# Patient Record
Sex: MALE | Race: WHITE | NOT HISPANIC OR LATINO | Employment: OTHER | URBAN - METROPOLITAN AREA
[De-identification: names, ages, dates, MRNs, and addresses within clinical notes are randomized per-mention and may not be internally consistent; named-entity substitution may affect disease eponyms.]

---

## 2024-01-09 ENCOUNTER — OFFICE VISIT (OUTPATIENT)
Dept: ORTHOPEDIC SURGERY | Facility: CLINIC | Age: 49
End: 2024-01-09
Payer: MEDICARE

## 2024-01-09 ENCOUNTER — HOSPITAL ENCOUNTER (OUTPATIENT)
Dept: RADIOLOGY | Facility: CLINIC | Age: 49
Discharge: HOME | End: 2024-01-09
Payer: MEDICARE

## 2024-01-09 VITALS — HEIGHT: 72 IN

## 2024-01-09 DIAGNOSIS — R52 PAIN: ICD-10-CM

## 2024-01-09 DIAGNOSIS — M79.644 FINGER PAIN, RIGHT: Primary | ICD-10-CM

## 2024-01-09 DIAGNOSIS — S62.609G: ICD-10-CM

## 2024-01-09 PROCEDURE — 73130 X-RAY EXAM OF HAND: CPT | Mod: RT

## 2024-01-09 PROCEDURE — 1036F TOBACCO NON-USER: CPT | Performed by: ORTHOPAEDIC SURGERY

## 2024-01-09 PROCEDURE — 99213 OFFICE O/P EST LOW 20 MIN: CPT | Performed by: ORTHOPAEDIC SURGERY

## 2024-01-09 PROCEDURE — 99203 OFFICE O/P NEW LOW 30 MIN: CPT | Performed by: ORTHOPAEDIC SURGERY

## 2024-01-09 RX ORDER — SAW PALMETTO 160 MG
160 CAPSULE ORAL 2 TIMES DAILY
COMMUNITY

## 2024-01-09 RX ORDER — AZELASTINE 1 MG/ML
1 SPRAY, METERED NASAL 2 TIMES DAILY
COMMUNITY

## 2024-01-09 RX ORDER — EPINEPHRINE 0.3 MG/.3ML
1 INJECTION SUBCUTANEOUS ONCE AS NEEDED
COMMUNITY
Start: 2024-01-06

## 2024-01-09 RX ORDER — CETIRIZINE HYDROCHLORIDE 10 MG/1
10 TABLET ORAL DAILY PRN
COMMUNITY

## 2024-01-09 RX ORDER — MONTELUKAST SODIUM 10 MG/1
10 TABLET ORAL NIGHTLY
COMMUNITY

## 2024-01-09 RX ORDER — ALBUTEROL SULFATE 90 UG/1
2 AEROSOL, METERED RESPIRATORY (INHALATION) EVERY 4 HOURS PRN
COMMUNITY
Start: 2024-01-06

## 2024-01-09 RX ORDER — FLUTICASONE FUROATE AND VILANTEROL 200; 25 UG/1; UG/1
1 POWDER RESPIRATORY (INHALATION) 2 TIMES WEEKLY
COMMUNITY

## 2024-01-09 RX ORDER — FLUTICASONE PROPIONATE 50 MCG
2 SPRAY, SUSPENSION (ML) NASAL DAILY
COMMUNITY
Start: 2024-01-06

## 2024-01-09 ASSESSMENT — PATIENT HEALTH QUESTIONNAIRE - PHQ9
1. LITTLE INTEREST OR PLEASURE IN DOING THINGS: NOT AT ALL
2. FEELING DOWN, DEPRESSED OR HOPELESS: NOT AT ALL
SUM OF ALL RESPONSES TO PHQ9 QUESTIONS 1 AND 2: 0

## 2024-01-09 ASSESSMENT — PAIN SCALES - GENERAL: PAINLEVEL: 0-NO PAIN

## 2024-01-09 NOTE — PROGRESS NOTES
Subjective      Chief Complaint   Patient presents with    Right Hand - Fracture     Rt pinky finger fx 10/2023- not seen        No surgery found     HPI    This 48-year-old gentleman injured his right small finger in October Of 2023.  He never sought any treatment.  He comes in today complaining of some right small finger pain but mostly complaining of inability to move his right small finger as well as a significant deformity.    CARDIOLOGY:   Negative for chest pain, shortness of breath.   RESPIRATORY:   Negative for chest pain, shortness of breath.   MUSCULOSKELETAL:   See HPI for details.   NEUROLOGY:   Negative for tingling, numbness, weakness.    Objective    There were no vitals filed for this visit.    Physical Exam  GENERAL:          General Appearance:  This is a pleasant patient with appropriate affect, in no acute distress.   DERMATOLOGY:          Skin: skin at the neck, upper and lower back, and trunk is intact. There is no evidence of skin rash, skin breakdown or ulceration, or atrophic skin change.   EXTREMITIES:          Vascular:  Right, left hands and feet are warm with good color and pulses. Right and left calf and thigh are nontender and nonswollen.   NEUROLOGICAL:          Orientation:  Patient is alert and oriented to person, place, time and situation. Right and left upper and lower extremity motor and sensory examinations are intact.      MUSCULOSKELETAL: Neck: Nontender. No pain with range of motion. Right hand:   There is an obvious deformity at the PIP joint of the right small finger.  The patient is unable to move his right small finger and has pain using his right hand for activities such as using a cane and holding it in his right hand and also shaking hands with his right hand.    XR hand right 3+ views  Done and read in the office today show a dislocation at the PIP joint of the right small finger.  I reviewed these x-rays with the patient in the office today.    Result Date:  1/9/2024  These images are not reportable by radiology and will not be interpreted by  Radiologists.       Kashif was seen today for fracture.  Diagnoses and all orders for this visit:  Finger pain, right (Primary)  Closed fracture dislocation of proximal interphalangeal (PIP) joint of finger with delayed healing     Options are discussed with the patient in detail. The patient is given a   Written referral to be further evaluated and treated by either Dr. Eric Cameron or Dr. Dick Loya and is given their office numbers and directions on how to get to their office.The patient is instructed regarding activity modification and risk for further injury with falling or trauma, ice, physician directed at home gentle strengthening and ROM exercises, and the appropriate use of Tylenol as needed for pain with its potential adverse reactions and side effects. The patient understands  And appreciates this.  Return to see me as needed.. Please note that this report has been produced using speech recognition software.  It may contain errors related to grammar, punctuation or spelling.  Electronically signed, but not reviewed.    Abdon Mayorga MD

## 2024-01-17 ENCOUNTER — PRE-ADMISSION TESTING (OUTPATIENT)
Dept: PREADMISSION TESTING | Facility: HOSPITAL | Age: 49
End: 2024-01-17
Payer: MEDICARE

## 2024-01-17 ENCOUNTER — LAB (OUTPATIENT)
Dept: LAB | Facility: LAB | Age: 49
End: 2024-01-17
Payer: MEDICARE

## 2024-01-17 VITALS
BODY MASS INDEX: 35.56 KG/M2 | TEMPERATURE: 97 F | WEIGHT: 262.57 LBS | OXYGEN SATURATION: 98 % | SYSTOLIC BLOOD PRESSURE: 158 MMHG | DIASTOLIC BLOOD PRESSURE: 92 MMHG | HEIGHT: 72 IN | HEART RATE: 84 BPM

## 2024-01-17 DIAGNOSIS — Z01.818 PREOP TESTING: Primary | ICD-10-CM

## 2024-01-17 DIAGNOSIS — Z01.818 PREOP TESTING: ICD-10-CM

## 2024-01-17 LAB
ANION GAP SERPL CALC-SCNC: 11 MMOL/L
BUN SERPL-MCNC: 15 MG/DL (ref 8–25)
CALCIUM SERPL-MCNC: 9.1 MG/DL (ref 8.5–10.4)
CHLORIDE SERPL-SCNC: 100 MMOL/L (ref 97–107)
CO2 SERPL-SCNC: 26 MMOL/L (ref 24–31)
CREAT SERPL-MCNC: 0.8 MG/DL (ref 0.4–1.6)
EGFRCR SERPLBLD CKD-EPI 2021: >90 ML/MIN/1.73M*2
GLUCOSE SERPL-MCNC: 211 MG/DL (ref 65–99)
POTASSIUM SERPL-SCNC: 4.5 MMOL/L (ref 3.4–5.1)
SODIUM SERPL-SCNC: 137 MMOL/L (ref 133–145)

## 2024-01-17 PROCEDURE — 99203 OFFICE O/P NEW LOW 30 MIN: CPT | Performed by: NURSE PRACTITIONER

## 2024-01-17 PROCEDURE — 36415 COLL VENOUS BLD VENIPUNCTURE: CPT

## 2024-01-17 PROCEDURE — 80048 BASIC METABOLIC PNL TOTAL CA: CPT

## 2024-01-17 PROCEDURE — 93005 ELECTROCARDIOGRAM TRACING: CPT

## 2024-01-17 RX ORDER — TURMERIC 400 MG
1 CAPSULE ORAL DAILY
COMMUNITY

## 2024-01-17 RX ORDER — GINKGO BILOBA LEAF EXTRACT 60 MG
1 CAPSULE ORAL DAILY
COMMUNITY

## 2024-01-17 RX ORDER — BISMUTH SUBSALICYLATE 262 MG
1 TABLET,CHEWABLE ORAL DAILY
COMMUNITY

## 2024-01-17 RX ORDER — IBUPROFEN 100 MG/5ML
1000 SUSPENSION, ORAL (FINAL DOSE FORM) ORAL DAILY
COMMUNITY

## 2024-01-17 RX ORDER — UBIDECARENONE 30 MG
30 CAPSULE ORAL DAILY
COMMUNITY

## 2024-01-17 RX ORDER — GLUCOSAM/CHONDRO/HERB 149/HYAL 750-100 MG
1 TABLET ORAL DAILY
COMMUNITY

## 2024-01-17 ASSESSMENT — CHADS2 SCORE
DIABETES: NO
HYPERTENSION: NO
CHADS2 SCORE: 0
AGE GREATER THAN OR EQUAL TO 75: NO
PRIOR STROKE OR TIA OR THROMBOEMBOLISM: NO
CHF: NO
AGE GREATER THAN OR EQUAL TO 75: NO

## 2024-01-17 ASSESSMENT — ENCOUNTER SYMPTOMS
CONSTITUTIONAL NEGATIVE: 1
PSYCHIATRIC NEGATIVE: 1
NEUROLOGICAL NEGATIVE: 1
CARDIOVASCULAR NEGATIVE: 1
HEMATOLOGIC/LYMPHATIC NEGATIVE: 1
GASTROINTESTINAL NEGATIVE: 1

## 2024-01-17 ASSESSMENT — DUKE ACTIVITY SCORE INDEX (DASI)
CAN YOU WALK A BLOCK OR TWO ON LEVEL GROUND: YES
CAN YOU DO HEAVY WORK AROUND THE HOUSE LIKE SCRUBBING FLOORS OR LIFTING AND MOVING HEAVY FURNITURE: YES
CAN YOU PARTICIPATE IN STRENOUS SPORTS LIKE SWIMMING, SINGLES TENNIS, FOOTBALL, BASKETBALL, OR SKIING: YES
CAN YOU DO LIGHT WORK AROUND THE HOUSE LIKE DUSTING OR WASHING DISHES: YES
CAN YOU HAVE SEXUAL RELATIONS: YES
TOTAL_SCORE: 58.2
CAN YOU DO MODERATE WORK AROUND THE HOUSE LIKE VACUUMING, SWEEPING FLOORS OR CARRYING GROCERIES: YES
DASI METS SCORE: 9.9
CAN YOU WALK INDOORS, SUCH AS AROUND YOUR HOUSE: YES
CAN YOU RUN A SHORT DISTANCE: YES
CAN YOU DO YARD WORK LIKE RAKING LEAVES, WEEDING OR PUSHING A MOWER: YES
CAN YOU PARTICIPATE IN MODERATE RECREATIONAL ACTIVITIES LIKE GOLF, BOWLING, DANCING, DOUBLES TENNIS OR THROWING A BASEBALL OR FOOTBALL: YES
CAN YOU TAKE CARE OF YOURSELF (EAT, DRESS, BATHE, OR USE TOILET): YES
CAN YOU CLIMB A FLIGHT OF STAIRS OR WALK UP A HILL: YES

## 2024-01-17 NOTE — H&P (VIEW-ONLY)
CPM/PAT Evaluation       Name: Kashif Perdomo (Kashif Perdomo)  /Age: 1975/48 y.o.     In-Person       Chief Complaint: right small finger    HPI    Pt is a 48 year old male who reports injuring his right small finger in October. Pt stated at the time he did not receive any medical attention for the injury. Pt stated he wanted to see if his finger would get better on its own. Over the last few months he noticed his right small finger remained slightly swollen and was painful to bend. He also reports that he is unable to bend his right small finger to  objects. Pt was examined by a surgeon and completed an xray. He has been scheduled for open reduction internal fixation digit hand right and repair soft tissue hand. Pt denies CP, SOB, or dizziness    Past Medical History:   Diagnosis Date    Asthma     Depression     Environmental allergies     ESTELA (obstructive sleep apnea)        Past Surgical History:   Procedure Laterality Date    APPENDECTOMY      NASAL SEPTUM SURGERY      SINUS SURGERY       Social History     Tobacco Use    Smoking status: Never    Smokeless tobacco: Former   Substance Use Topics    Alcohol use: Not Currently     Comment: LESS THAN MONTHLY     Social History     Substance and Sexual Activity   Drug Use Never       Allergies   Allergen Reactions    Dog Dander Other and Unknown     Asthma flares up, sometimes will break out in hives    House Dust Mite Unknown     Nose runs    Mold Unknown     Nose runs    Pollen, Micronized Unknown     Nose runs    Prednisone Anxiety     Current Outpatient Medications   Medication Sig Dispense Refill    albuterol 90 mcg/actuation inhaler Inhale 2 puffs every 4 hours if needed for other or wheezing.      ascorbic acid (Vitamin C) 1,000 mg tablet Take 1 tablet (1,000 mg) by mouth once daily.      azelastine (Astelin) 137 mcg (0.1 %) nasal spray Administer 1 spray into each nostril 2 times a day. Use in each nostril as directed      B complex-vitamin  C-folic acid (Nephro-Dwight Rx) 1- mg-mg-mcg tablet Take 1 tablet by mouth once daily with breakfast.      calcium-vits C8-Q-K7-minerals 166.75 mg- 166.75 unit capsule Take 1 capsule by mouth once daily.      cetirizine (ZyrTEC) 10 mg tablet Take 1 tablet (10 mg) by mouth once daily as needed for allergies.      co-enzyme Q-10 30 mg capsule Take 1 capsule (30 mg) by mouth once daily.      EPINEPHrine 0.3 mg/0.3 mL injection syringe Inject 0.3 mL (0.3 mg) into the muscle 1 time if needed for anaphylaxis.      fluticasone (Flonase) 50 mcg/actuation nasal spray Administer 2 sprays into each nostril once daily.      fluticasone furoate-vilanteroL (Breo Ellipta) 200-25 mcg/dose inhaler Inhale 1 puff 2 times a week.      GARLIC EXTRACT ORAL Take by mouth.      GINKGO BILOBA EXTRACT ORAL Take 1 capsule by mouth once daily.      ginseng 100 mg capsule Take 1 capsule by mouth once daily.      montelukast (Singulair) 10 mg tablet Take 1 tablet (10 mg) by mouth once daily at bedtime.      multivitamin tablet Take 1 tablet by mouth once daily.      omega 3-dha-epa-fish oil (Fish OiL) 1,000 mg (120 mg-180 mg) capsule Take 1 capsule (1,000 mg) by mouth once daily.      potassium iodide 65 mg tablet Take 1 capsule by mouth once daily.      saw palmetto 160 mg capsule Take 1 capsule (160 mg) by mouth 2 times a day.      turmeric 400 mg capsule Take 1 Capful by mouth once daily.       No current facility-administered medications for this visit.     Review of Systems   Constitutional: Negative.    HENT: Negative.     Respiratory:          H/O Asthma and severe environmental allergies   Cardiovascular: Negative.    Gastrointestinal: Negative.    Genitourinary: Negative.    Musculoskeletal:         Occasional hip and knee pain; pt stated he uses a cane to ambulate long distances.   Skin: Negative.    Neurological: Negative.    Hematological: Negative.    Psychiatric/Behavioral: Negative.       BP (!) 170/91   Pulse 84   Temp 36.1  °C (97 °F) (Temporal)   Ht 1.829 m (6')   Wt 119 kg (262 lb 9.1 oz)   SpO2 98%   BMI 35.61 kg/m²   Rechecked BP using manual BP cuff BP: 158/92    Physical Exam  Vitals reviewed.   Constitutional:       Appearance: Normal appearance.   HENT:      Head: Normocephalic and atraumatic.      Nose: Nose normal.      Mouth/Throat:      Mouth: Mucous membranes are moist.      Pharynx: Oropharynx is clear.   Eyes:      Extraocular Movements: Extraocular movements intact.      Pupils: Pupils are equal, round, and reactive to light.   Cardiovascular:      Rate and Rhythm: Normal rate and regular rhythm.      Pulses: Normal pulses.      Heart sounds: Normal heart sounds.   Pulmonary:      Effort: Pulmonary effort is normal.      Breath sounds: Normal breath sounds. No wheezing, rhonchi or rales.   Abdominal:      General: Bowel sounds are normal.      Palpations: Abdomen is soft.   Musculoskeletal:         General: Swelling (mild swelling noted to proximal right small finger.) and tenderness (pain with ROM of right small finger.) present.      Cervical back: Normal range of motion.   Skin:     General: Skin is warm and dry.   Neurological:      General: No focal deficit present.      Mental Status: He is alert and oriented to person, place, and time.   Psychiatric:         Mood and Affect: Mood normal.         Behavior: Behavior normal.         Thought Content: Thought content normal.         Judgment: Judgment normal.        PAT AIRWAY:   Airway:     Mallampati::  III    TM distance::  >3 FB    Neck ROM::  Full  normal      ASA:II  DASI: 58.2  METS: 9.9  CHADS: 1.9%  RCRI: 0.4%  Ariscat: 1.6%    Assessment and Plan:     Dislocation of proximal interphalangeal joint of right little finger: open reduction internal fixation digit hand right; repair soft tissue hand.  Asthma: Pt is taking Breo Ellipta twice per week. Pt reports he has not needed to use his albuterol.   ESTELA: noncompliant with CPAP  BMI: 35.61  HTN: Pt stated he  has never been diagnosed with HTN;  I advised pt to start checking his BP. I advised him to establish a PCP to have his Blood pressure monitored. BP today in PAT is 158/92. Pt stated he is going to try reducing his caffeine intake. Pt stated he drinks 6 cups of coffee per day. EKG performed in [facility].    JULIA Carlos-CNP

## 2024-01-17 NOTE — PREPROCEDURE INSTRUCTIONS
Medication List            Accurate as of January 17, 2024  7:25 AM. Always use your most recent med list.                albuterol 90 mcg/actuation inhaler  Notes to patient: USE MORNING OF SURGERY/BRING TO HOSPITAL     ascorbic acid 1,000 mg tablet  Commonly known as: Vitamin C  Medication Adjustments for Surgery: Stop 7 days before surgery     azelastine 137 mcg (0.1 %) nasal spray  Commonly known as: Astelin  Medication Adjustments for Surgery: Continue until night before surgery     B complex-vitamin C-folic acid 1- mg-mg-mcg tablet  Commonly known as: Nephro-Dwight Rx  Medication Adjustments for Surgery: Stop 7 days before surgery     Breo Ellipta 200-25 mcg/dose inhaler  Generic drug: fluticasone furoate-vilanteroL  Notes to patient: USE MORNING OF SURGERY     calcium-vits Z8-U-I3-minerals 166.75 mg- 166.75 unit capsule  Medication Adjustments for Surgery: Stop 7 days before surgery     cetirizine 10 mg tablet  Commonly known as: ZyrTEC  Medication Adjustments for Surgery: Continue until night before surgery     co-enzyme Q-10 30 mg capsule  Medication Adjustments for Surgery: Stop 7 days before surgery     EPINEPHrine 0.3 mg/0.3 mL injection syringe  Commonly known as: Epipen  Notes to patient: BRING TO HOSPITAL     Fish OiL 1,000 mg (120 mg-180 mg) capsule  Generic drug: omega 3-dha-epa-fish oil     fluticasone 50 mcg/actuation nasal spray  Commonly known as: Flonase  Medication Adjustments for Surgery: Continue until night before surgery     GARLIC EXTRACT ORAL  Medication Adjustments for Surgery: Stop 7 days before surgery     GINKGO BILOBA EXTRACT ORAL  Medication Adjustments for Surgery: Stop 7 days before surgery     ginseng 100 mg capsule  Medication Adjustments for Surgery: Stop 7 days before surgery     montelukast 10 mg tablet  Commonly known as: Singulair  Medication Adjustments for Surgery: Take morning of surgery with sip of water, no other fluids     multivitamin tablet  Medication  Adjustments for Surgery: Stop 7 days before surgery     potassium iodide 65 mg tablet  Medication Adjustments for Surgery: Stop 7 days before surgery     saw palmetto 160 mg capsule  Medication Adjustments for Surgery: Stop 7 days before surgery     turmeric 400 mg capsule  Medication Adjustments for Surgery: Stop 7 days before surgery                              NPO Instructions:    Do not eat any food after midnight the night before your surgery/procedure.    Additional Instructions:     Day of Surgery:  Review your medication instructions, take indicated medications  Wear  comfortable loose fitting clothing  Do not use moisturizers, creams, lotions or perfume  All jewelry and valuables should be left at home  PAT DISCHARGE INSTRUCTIONS    Please call the Same Day Surgery (SDS) Department of the hospital where your procedure will be performed after 2:00 PM the day before your surgery. If you are scheduled on a Monday, or a Tuesday following a Monday holiday, you will need to call on the last business day prior to your surgery.    [address], [id number] [phone]      Please let your surgeon know if:      You develop any open sores, shingles, burning or painful urination as these may increase your risk of an infection.   You no longer wish to have the surgery.   Any other personal circumstances change that may lead to the need to cancel or defer this surgery-such as being sick or getting admitted to any hospital within one week of your planned procedure.    Your contact details change, such as a change of address or phone number.    Starting now:     Please DO NOT drink alcohol or smoke for 24 hours before surgery. It is well known that quitting smoking can make a huge difference to your health and recovery from surgery. The longer you abstain from smoking, the better your chances of a healthy recovery. If you need help with quitting, call  1-800-QUIT-NOW to be connected to a trained counselor who will discuss the best methods to help you quit.     Before your surgery:    Please stop all supplements 7 days prior to surgery. Or as directed by your surgeon.   Please stop taking NSAID pain medicine such as Advil and Motrin 7 days before surgery.    If you develop any fever, cough, cold, rashes, cuts, scratches, scrapes, urinary symptoms or infection anywhere on your body (including teeth and gums) prior to surgery, please call your surgeon’s office as soon as possible. This may require treatment to reduce the chance of cancellation on the day of surgery.    The day before your surgery:   DIET- Do not eat any food after MIDNIGHT. May have 10 ounces of CLEAR LIQUIDS until TWO HOURS before your arrival time. This includes water, black tea or coffee (no milk ir cream), apple juice and electrolyte drinks (Gatorade). May chew gum until TWO hours before your surgery time.   Get a good night’s rest.  Use the special soap for bathing if you have been instructed to use one.    Scheduled surgery times may change and you will be notified if this occurs - please check your personal voicemail for any updates.     On the morning of surgery:   Wear comfortable, loose fitting clothes which open in the front. Please do not wear moisturizers, creams, lotions, makeup or perfume.    Please bring with you to surgery:   Photo ID and insurance card   Current list of medicines and allergies   Pacemaker/ Defibrillator/Heart stent cards   CPAP machine and mask    Slings/ splints/ crutches   A copy of your complete advanced directive/DHPOA.    Please do NOT bring with you to surgery:   All jewelry and valuables should be left at home.   Prosthetic devices such as contact lenses, hearing aids, dentures, eyelash extensions, hairpins and body piercings must be removed prior to going in to the surgical suite.    After outpatient surgery:   A responsible adult MUST accompany you at the  time of discharge and stay with you for 24 hours after your surgery. You may NOT drive yourself home after surgery.    Do not drive, operate machinery, make critical decisions or do activities that require co-ordination or balance until after a night’s sleep.   Do not drink alcoholic beverages for 24 hours.   Instructions for resuming your medications will be provided by your surgeon.    CALL YOUR DOCTOR AFTER SURGERY IF YOU HAVE:     Chills and/or a fever of 101° F or higher.    Redness, swelling, pus or drainage from your surgical wound or a bad smell from the wound.    Lightheadedness, fainting or confusion.    Persistent vomiting (throwing up) and are not able to eat or drink for 12 hours.    Three or more loose, watery bowel movements in 24 hours (diarrhea).   Difficulty or pain while urinating( after non-urological surgery)    Pain and swelling in your legs, especially if it is only on one side.    Difficulty breathing or are breathing faster than normal.    Any new concerning symptoms.

## 2024-01-17 NOTE — CPM/PAT H&P
CPM/PAT Evaluation       Name: Kashif Perdomo (Kashif Perdomo)  /Age: 1975/48 y.o.     In-Person       Chief Complaint: right small finger    HPI    Pt is a 48 year old male who reports injuring his right small finger in October. Pt stated at the time he did not receive any medical attention for the injury. Pt stated he wanted to see if his finger would get better on its own. Over the last few months he noticed his right small finger remained slightly swollen and was painful to bend. He also reports that he is unable to bend his right small finger to  objects. Pt was examined by a surgeon and completed an xray. He has been scheduled for open reduction internal fixation digit hand right and repair soft tissue hand. Pt denies CP, SOB, or dizziness    Past Medical History:   Diagnosis Date    Asthma     Depression     Environmental allergies     ESTELA (obstructive sleep apnea)        Past Surgical History:   Procedure Laterality Date    APPENDECTOMY      NASAL SEPTUM SURGERY      SINUS SURGERY       Social History     Tobacco Use    Smoking status: Never    Smokeless tobacco: Former   Substance Use Topics    Alcohol use: Not Currently     Comment: LESS THAN MONTHLY     Social History     Substance and Sexual Activity   Drug Use Never       Allergies   Allergen Reactions    Dog Dander Other and Unknown     Asthma flares up, sometimes will break out in hives    House Dust Mite Unknown     Nose runs    Mold Unknown     Nose runs    Pollen, Micronized Unknown     Nose runs    Prednisone Anxiety     Current Outpatient Medications   Medication Sig Dispense Refill    albuterol 90 mcg/actuation inhaler Inhale 2 puffs every 4 hours if needed for other or wheezing.      ascorbic acid (Vitamin C) 1,000 mg tablet Take 1 tablet (1,000 mg) by mouth once daily.      azelastine (Astelin) 137 mcg (0.1 %) nasal spray Administer 1 spray into each nostril 2 times a day. Use in each nostril as directed      B complex-vitamin  C-folic acid (Nephro-Dwight Rx) 1- mg-mg-mcg tablet Take 1 tablet by mouth once daily with breakfast.      calcium-vits W9-Q-S5-minerals 166.75 mg- 166.75 unit capsule Take 1 capsule by mouth once daily.      cetirizine (ZyrTEC) 10 mg tablet Take 1 tablet (10 mg) by mouth once daily as needed for allergies.      co-enzyme Q-10 30 mg capsule Take 1 capsule (30 mg) by mouth once daily.      EPINEPHrine 0.3 mg/0.3 mL injection syringe Inject 0.3 mL (0.3 mg) into the muscle 1 time if needed for anaphylaxis.      fluticasone (Flonase) 50 mcg/actuation nasal spray Administer 2 sprays into each nostril once daily.      fluticasone furoate-vilanteroL (Breo Ellipta) 200-25 mcg/dose inhaler Inhale 1 puff 2 times a week.      GARLIC EXTRACT ORAL Take by mouth.      GINKGO BILOBA EXTRACT ORAL Take 1 capsule by mouth once daily.      ginseng 100 mg capsule Take 1 capsule by mouth once daily.      montelukast (Singulair) 10 mg tablet Take 1 tablet (10 mg) by mouth once daily at bedtime.      multivitamin tablet Take 1 tablet by mouth once daily.      omega 3-dha-epa-fish oil (Fish OiL) 1,000 mg (120 mg-180 mg) capsule Take 1 capsule (1,000 mg) by mouth once daily.      potassium iodide 65 mg tablet Take 1 capsule by mouth once daily.      saw palmetto 160 mg capsule Take 1 capsule (160 mg) by mouth 2 times a day.      turmeric 400 mg capsule Take 1 Capful by mouth once daily.       No current facility-administered medications for this visit.     Review of Systems   Constitutional: Negative.    HENT: Negative.     Respiratory:          H/O Asthma and severe environmental allergies   Cardiovascular: Negative.    Gastrointestinal: Negative.    Genitourinary: Negative.    Musculoskeletal:         Occasional hip and knee pain; pt stated he uses a cane to ambulate long distances.   Skin: Negative.    Neurological: Negative.    Hematological: Negative.    Psychiatric/Behavioral: Negative.       BP (!) 170/91   Pulse 84   Temp 36.1  °C (97 °F) (Temporal)   Ht 1.829 m (6')   Wt 119 kg (262 lb 9.1 oz)   SpO2 98%   BMI 35.61 kg/m²   Rechecked BP using manual BP cuff BP: 158/92    Physical Exam  Vitals reviewed.   Constitutional:       Appearance: Normal appearance.   HENT:      Head: Normocephalic and atraumatic.      Nose: Nose normal.      Mouth/Throat:      Mouth: Mucous membranes are moist.      Pharynx: Oropharynx is clear.   Eyes:      Extraocular Movements: Extraocular movements intact.      Pupils: Pupils are equal, round, and reactive to light.   Cardiovascular:      Rate and Rhythm: Normal rate and regular rhythm.      Pulses: Normal pulses.      Heart sounds: Normal heart sounds.   Pulmonary:      Effort: Pulmonary effort is normal.      Breath sounds: Normal breath sounds. No wheezing, rhonchi or rales.   Abdominal:      General: Bowel sounds are normal.      Palpations: Abdomen is soft.   Musculoskeletal:         General: Swelling (mild swelling noted to proximal right small finger.) and tenderness (pain with ROM of right small finger.) present.      Cervical back: Normal range of motion.   Skin:     General: Skin is warm and dry.   Neurological:      General: No focal deficit present.      Mental Status: He is alert and oriented to person, place, and time.   Psychiatric:         Mood and Affect: Mood normal.         Behavior: Behavior normal.         Thought Content: Thought content normal.         Judgment: Judgment normal.        PAT AIRWAY:   Airway:     Mallampati::  III    TM distance::  >3 FB    Neck ROM::  Full  normal      ASA:II  DASI: 58.2  METS: 9.9  CHADS: 1.9%  RCRI: 0.4%  Ariscat: 1.6%    Assessment and Plan:     Dislocation of proximal interphalangeal joint of right little finger: open reduction internal fixation digit hand right; repair soft tissue hand.  Asthma: Pt is taking Breo Ellipta twice per week. Pt reports he has not needed to use his albuterol.   ESTELA: noncompliant with CPAP  BMI: 35.61  HTN: Pt stated he  has never been diagnosed with HTN;  I advised pt to start checking his BP. I advised him to establish a PCP to have his Blood pressure monitored. BP today in PAT is 158/92. Pt stated he is going to try reducing his caffeine intake. Pt stated he drinks 6 cups of coffee per day. EKG performed in [facility].    JULIA Carlos-CNP

## 2024-01-26 ENCOUNTER — APPOINTMENT (OUTPATIENT)
Dept: PREADMISSION TESTING | Facility: HOSPITAL | Age: 49
End: 2024-01-26
Payer: MEDICARE

## 2024-01-31 ENCOUNTER — HOSPITAL ENCOUNTER (OUTPATIENT)
Facility: HOSPITAL | Age: 49
Setting detail: OUTPATIENT SURGERY
Discharge: HOME | End: 2024-01-31
Attending: ORTHOPAEDIC SURGERY | Admitting: ORTHOPAEDIC SURGERY
Payer: MEDICARE

## 2024-01-31 ENCOUNTER — PHARMACY VISIT (OUTPATIENT)
Dept: PHARMACY | Facility: CLINIC | Age: 49
End: 2024-01-31
Payer: MEDICARE

## 2024-01-31 ENCOUNTER — APPOINTMENT (OUTPATIENT)
Dept: RADIOLOGY | Facility: HOSPITAL | Age: 49
End: 2024-01-31
Payer: MEDICARE

## 2024-01-31 ENCOUNTER — ANESTHESIA EVENT (OUTPATIENT)
Dept: OPERATING ROOM | Facility: HOSPITAL | Age: 49
End: 2024-01-31
Payer: MEDICARE

## 2024-01-31 ENCOUNTER — ANESTHESIA (OUTPATIENT)
Dept: OPERATING ROOM | Facility: HOSPITAL | Age: 49
End: 2024-01-31
Payer: MEDICARE

## 2024-01-31 VITALS
TEMPERATURE: 97.5 F | HEIGHT: 72 IN | WEIGHT: 262 LBS | BODY MASS INDEX: 35.49 KG/M2 | HEART RATE: 62 BPM | RESPIRATION RATE: 16 BRPM | OXYGEN SATURATION: 99 % | SYSTOLIC BLOOD PRESSURE: 152 MMHG | DIASTOLIC BLOOD PRESSURE: 89 MMHG

## 2024-01-31 DIAGNOSIS — S62.609G: Primary | ICD-10-CM

## 2024-01-31 PROBLEM — I10 HTN (HYPERTENSION): Status: ACTIVE | Noted: 2024-01-31

## 2024-01-31 PROBLEM — G47.33 OSA (OBSTRUCTIVE SLEEP APNEA): Status: ACTIVE | Noted: 2024-01-31

## 2024-01-31 PROBLEM — J45.909 ASTHMA (HHS-HCC): Status: ACTIVE | Noted: 2024-01-31

## 2024-01-31 PROCEDURE — 7100000009 HC PHASE TWO TIME - INITIAL BASE CHARGE: Performed by: ORTHOPAEDIC SURGERY

## 2024-01-31 PROCEDURE — 2500000005 HC RX 250 GENERAL PHARMACY W/O HCPCS: Performed by: ORTHOPAEDIC SURGERY

## 2024-01-31 PROCEDURE — A26785 PR OPEN TX INTERPHALANGEAL JOINT DISLOCATION 1: Performed by: STUDENT IN AN ORGANIZED HEALTH CARE EDUCATION/TRAINING PROGRAM

## 2024-01-31 PROCEDURE — 2500000004 HC RX 250 GENERAL PHARMACY W/ HCPCS (ALT 636 FOR OP/ED): Performed by: ORTHOPAEDIC SURGERY

## 2024-01-31 PROCEDURE — A26785 PR OPEN TX INTERPHALANGEAL JOINT DISLOCATION 1: Performed by: ANESTHESIOLOGIST ASSISTANT

## 2024-01-31 PROCEDURE — 3600000003 HC OR TIME - INITIAL BASE CHARGE - PROCEDURE LEVEL THREE: Performed by: ORTHOPAEDIC SURGERY

## 2024-01-31 PROCEDURE — 2500000005 HC RX 250 GENERAL PHARMACY W/O HCPCS: Performed by: STUDENT IN AN ORGANIZED HEALTH CARE EDUCATION/TRAINING PROGRAM

## 2024-01-31 PROCEDURE — 2500000004 HC RX 250 GENERAL PHARMACY W/ HCPCS (ALT 636 FOR OP/ED): Performed by: ANESTHESIOLOGIST ASSISTANT

## 2024-01-31 PROCEDURE — 2720000007 HC OR 272 NO HCPCS: Performed by: ORTHOPAEDIC SURGERY

## 2024-01-31 PROCEDURE — 3700000001 HC GENERAL ANESTHESIA TIME - INITIAL BASE CHARGE: Performed by: ORTHOPAEDIC SURGERY

## 2024-01-31 PROCEDURE — 2500000004 HC RX 250 GENERAL PHARMACY W/ HCPCS (ALT 636 FOR OP/ED): Performed by: STUDENT IN AN ORGANIZED HEALTH CARE EDUCATION/TRAINING PROGRAM

## 2024-01-31 PROCEDURE — 76000 FLUOROSCOPY <1 HR PHYS/QHP: CPT

## 2024-01-31 PROCEDURE — 3600000008 HC OR TIME - EACH INCREMENTAL 1 MINUTE - PROCEDURE LEVEL THREE: Performed by: ORTHOPAEDIC SURGERY

## 2024-01-31 PROCEDURE — 3700000002 HC GENERAL ANESTHESIA TIME - EACH INCREMENTAL 1 MINUTE: Performed by: ORTHOPAEDIC SURGERY

## 2024-01-31 PROCEDURE — RXMED WILLOW AMBULATORY MEDICATION CHARGE

## 2024-01-31 PROCEDURE — 7100000010 HC PHASE TWO TIME - EACH INCREMENTAL 1 MINUTE: Performed by: ORTHOPAEDIC SURGERY

## 2024-01-31 PROCEDURE — 7100000001 HC RECOVERY ROOM TIME - INITIAL BASE CHARGE: Performed by: ORTHOPAEDIC SURGERY

## 2024-01-31 PROCEDURE — 7100000002 HC RECOVERY ROOM TIME - EACH INCREMENTAL 1 MINUTE: Performed by: ORTHOPAEDIC SURGERY

## 2024-01-31 RX ORDER — IBUPROFEN 400 MG/1
600 TABLET ORAL EVERY 6 HOURS PRN
Status: DISCONTINUED | OUTPATIENT
Start: 2024-01-31 | End: 2024-01-31 | Stop reason: HOSPADM

## 2024-01-31 RX ORDER — SODIUM CHLORIDE, SODIUM LACTATE, POTASSIUM CHLORIDE, CALCIUM CHLORIDE 600; 310; 30; 20 MG/100ML; MG/100ML; MG/100ML; MG/100ML
100 INJECTION, SOLUTION INTRAVENOUS CONTINUOUS
Status: DISCONTINUED | OUTPATIENT
Start: 2024-01-31 | End: 2024-01-31 | Stop reason: HOSPADM

## 2024-01-31 RX ORDER — MIDAZOLAM HYDROCHLORIDE 1 MG/ML
INJECTION, SOLUTION INTRAMUSCULAR; INTRAVENOUS AS NEEDED
Status: DISCONTINUED | OUTPATIENT
Start: 2024-01-31 | End: 2024-01-31

## 2024-01-31 RX ORDER — FENTANYL CITRATE 50 UG/ML
50 INJECTION, SOLUTION INTRAMUSCULAR; INTRAVENOUS EVERY 5 MIN PRN
Status: DISCONTINUED | OUTPATIENT
Start: 2024-01-31 | End: 2024-01-31 | Stop reason: HOSPADM

## 2024-01-31 RX ORDER — ONDANSETRON HYDROCHLORIDE 2 MG/ML
4 INJECTION, SOLUTION INTRAVENOUS ONCE AS NEEDED
Status: DISCONTINUED | OUTPATIENT
Start: 2024-01-31 | End: 2024-01-31 | Stop reason: HOSPADM

## 2024-01-31 RX ORDER — PROPOFOL 10 MG/ML
INJECTION, EMULSION INTRAVENOUS AS NEEDED
Status: DISCONTINUED | OUTPATIENT
Start: 2024-01-31 | End: 2024-01-31

## 2024-01-31 RX ORDER — IPRATROPIUM BROMIDE 0.5 MG/2.5ML
500 SOLUTION RESPIRATORY (INHALATION) EVERY 30 MIN PRN
Status: DISCONTINUED | OUTPATIENT
Start: 2024-01-31 | End: 2024-01-31 | Stop reason: HOSPADM

## 2024-01-31 RX ORDER — SODIUM CHLORIDE, SODIUM LACTATE, POTASSIUM CHLORIDE, CALCIUM CHLORIDE 600; 310; 30; 20 MG/100ML; MG/100ML; MG/100ML; MG/100ML
40 INJECTION, SOLUTION INTRAVENOUS CONTINUOUS
Status: DISCONTINUED | OUTPATIENT
Start: 2024-01-31 | End: 2024-01-31 | Stop reason: HOSPADM

## 2024-01-31 RX ORDER — ALBUTEROL SULFATE 0.83 MG/ML
2.5 SOLUTION RESPIRATORY (INHALATION) EVERY 30 MIN PRN
Status: DISCONTINUED | OUTPATIENT
Start: 2024-01-31 | End: 2024-01-31 | Stop reason: HOSPADM

## 2024-01-31 RX ORDER — OXYCODONE AND ACETAMINOPHEN 5; 325 MG/1; MG/1
1 TABLET ORAL EVERY 6 HOURS PRN
Status: DISCONTINUED | OUTPATIENT
Start: 2024-01-31 | End: 2024-01-31 | Stop reason: HOSPADM

## 2024-01-31 RX ORDER — LIDOCAINE HYDROCHLORIDE 10 MG/ML
INJECTION INFILTRATION; PERINEURAL AS NEEDED
Status: DISCONTINUED | OUTPATIENT
Start: 2024-01-31 | End: 2024-01-31

## 2024-01-31 RX ORDER — CEFAZOLIN SODIUM 2 G/100ML
2 INJECTION, SOLUTION INTRAVENOUS ONCE
Status: COMPLETED | OUTPATIENT
Start: 2024-01-31 | End: 2024-01-31

## 2024-01-31 RX ORDER — BUPIVACAINE HYDROCHLORIDE 2.5 MG/ML
INJECTION, SOLUTION INFILTRATION; PERINEURAL AS NEEDED
Status: DISCONTINUED | OUTPATIENT
Start: 2024-01-31 | End: 2024-01-31 | Stop reason: HOSPADM

## 2024-01-31 RX ORDER — PROPOFOL 10 MG/ML
INJECTION, EMULSION INTRAVENOUS CONTINUOUS PRN
Status: DISCONTINUED | OUTPATIENT
Start: 2024-01-31 | End: 2024-01-31

## 2024-01-31 RX ORDER — IBUPROFEN 600 MG/1
600 TABLET ORAL 4 TIMES DAILY PRN
Qty: 30 TABLET | Refills: 0 | Status: SHIPPED | OUTPATIENT
Start: 2024-01-31

## 2024-01-31 RX ORDER — FENTANYL CITRATE 50 UG/ML
INJECTION, SOLUTION INTRAMUSCULAR; INTRAVENOUS AS NEEDED
Status: DISCONTINUED | OUTPATIENT
Start: 2024-01-31 | End: 2024-01-31

## 2024-01-31 RX ORDER — OXYCODONE AND ACETAMINOPHEN 5; 325 MG/1; MG/1
1 TABLET ORAL EVERY 6 HOURS PRN
Qty: 20 TABLET | Refills: 0 | Status: SHIPPED | OUTPATIENT
Start: 2024-01-31

## 2024-01-31 RX ORDER — KETAMINE HYDROCHLORIDE 50 MG/ML
INJECTION, SOLUTION INTRAMUSCULAR; INTRAVENOUS AS NEEDED
Status: DISCONTINUED | OUTPATIENT
Start: 2024-01-31 | End: 2024-01-31

## 2024-01-31 RX ORDER — LIDOCAINE HYDROCHLORIDE 10 MG/ML
INJECTION INFILTRATION; PERINEURAL AS NEEDED
Status: DISCONTINUED | OUTPATIENT
Start: 2024-01-31 | End: 2024-01-31 | Stop reason: HOSPADM

## 2024-01-31 RX ORDER — LABETALOL HYDROCHLORIDE 5 MG/ML
5 INJECTION, SOLUTION INTRAVENOUS EVERY 5 MIN PRN
Status: DISCONTINUED | OUTPATIENT
Start: 2024-01-31 | End: 2024-01-31 | Stop reason: HOSPADM

## 2024-01-31 RX ORDER — ONDANSETRON HYDROCHLORIDE 2 MG/ML
INJECTION, SOLUTION INTRAVENOUS AS NEEDED
Status: DISCONTINUED | OUTPATIENT
Start: 2024-01-31 | End: 2024-01-31

## 2024-01-31 RX ORDER — KETOROLAC TROMETHAMINE 30 MG/ML
INJECTION, SOLUTION INTRAMUSCULAR; INTRAVENOUS AS NEEDED
Status: DISCONTINUED | OUTPATIENT
Start: 2024-01-31 | End: 2024-01-31

## 2024-01-31 RX ADMIN — FENTANYL CITRATE 25 MCG: 50 INJECTION, SOLUTION INTRAMUSCULAR; INTRAVENOUS at 13:54

## 2024-01-31 RX ADMIN — KETOROLAC TROMETHAMINE 30 MG: 30 INJECTION, SOLUTION INTRAMUSCULAR at 14:49

## 2024-01-31 RX ADMIN — LABETALOL HYDROCHLORIDE 5 MG: 5 INJECTION, SOLUTION INTRAVENOUS at 15:10

## 2024-01-31 RX ADMIN — FENTANYL CITRATE 50 MCG: 50 INJECTION, SOLUTION INTRAMUSCULAR; INTRAVENOUS at 13:47

## 2024-01-31 RX ADMIN — PROPOFOL 30 MG: 10 INJECTION, EMULSION INTRAVENOUS at 14:41

## 2024-01-31 RX ADMIN — PROPOFOL 150 MG: 10 INJECTION, EMULSION INTRAVENOUS at 13:47

## 2024-01-31 RX ADMIN — FENTANYL CITRATE 25 MCG: 50 INJECTION, SOLUTION INTRAMUSCULAR; INTRAVENOUS at 14:08

## 2024-01-31 RX ADMIN — PROPOFOL 100 MCG/KG/MIN: 10 INJECTION, EMULSION INTRAVENOUS at 13:47

## 2024-01-31 RX ADMIN — ONDANSETRON HYDROCHLORIDE 4 MG: 2 INJECTION INTRAMUSCULAR; INTRAVENOUS at 14:49

## 2024-01-31 RX ADMIN — PROPOFOL 10 MG: 10 INJECTION, EMULSION INTRAVENOUS at 14:16

## 2024-01-31 RX ADMIN — PROPOFOL 60 MG: 10 INJECTION, EMULSION INTRAVENOUS at 14:35

## 2024-01-31 RX ADMIN — SODIUM CHLORIDE, SODIUM LACTATE, POTASSIUM CHLORIDE, AND CALCIUM CHLORIDE 100 ML/HR: 600; 310; 30; 20 INJECTION, SOLUTION INTRAVENOUS at 12:51

## 2024-01-31 RX ADMIN — CEFAZOLIN SODIUM 2 G: 2 INJECTION, SOLUTION INTRAVENOUS at 13:44

## 2024-01-31 RX ADMIN — KETAMINE HYDROCHLORIDE 25 MG: 50 INJECTION, SOLUTION INTRAMUSCULAR; INTRAVENOUS at 13:53

## 2024-01-31 RX ADMIN — MIDAZOLAM 2 MG: 1 INJECTION INTRAMUSCULAR; INTRAVENOUS at 13:47

## 2024-01-31 RX ADMIN — PROPOFOL 40 MG: 10 INJECTION, EMULSION INTRAVENOUS at 14:34

## 2024-01-31 RX ADMIN — PROPOFOL 30 MG: 10 INJECTION, EMULSION INTRAVENOUS at 14:05

## 2024-01-31 RX ADMIN — LABETALOL HYDROCHLORIDE 5 MG: 5 INJECTION, SOLUTION INTRAVENOUS at 15:23

## 2024-01-31 RX ADMIN — SODIUM CHLORIDE, SODIUM LACTATE, POTASSIUM CHLORIDE, AND CALCIUM CHLORIDE: 600; 310; 30; 20 INJECTION, SOLUTION INTRAVENOUS at 13:42

## 2024-01-31 RX ADMIN — MIDAZOLAM 2 MG: 1 INJECTION INTRAMUSCULAR; INTRAVENOUS at 13:56

## 2024-01-31 RX ADMIN — PROPOFOL 20 MG: 10 INJECTION, EMULSION INTRAVENOUS at 14:52

## 2024-01-31 RX ADMIN — LIDOCAINE HYDROCHLORIDE 50 MG: 10 INJECTION, SOLUTION INFILTRATION; PERINEURAL at 13:47

## 2024-01-31 SDOH — HEALTH STABILITY: MENTAL HEALTH: CURRENT SMOKER: 0

## 2024-01-31 ASSESSMENT — PAIN SCALES - GENERAL
PAINLEVEL_OUTOF10: 0 - NO PAIN
PAINLEVEL_OUTOF10: 1

## 2024-01-31 ASSESSMENT — PAIN - FUNCTIONAL ASSESSMENT
PAIN_FUNCTIONAL_ASSESSMENT: 0-10

## 2024-01-31 NOTE — SIGNIFICANT EVENT
Bedside report to Deanne NAVA. All questions answered. Preparing for transition to [facility] [place] Phase 2.

## 2024-01-31 NOTE — ANESTHESIA POSTPROCEDURE EVALUATION
Patient: Kashif Perdomo    Procedure Summary       Date: 01/31/24 Room / Location: [facility] 05 / Virtual [facility]    Anesthesia Start: 1341 Anesthesia Stop: 1506    Procedures:       Open Reduction Internal Fixation Digit Hand (Right: Little Finger)      Repair Soft Tissue Digit Hand (Right: Little Finger) Diagnosis:       Dislocation of proximal interphalangeal joint of right little finger, initial encounter      (RIGHT SMALL FINGER CHRONIC PROXIMAL INTERPHALANGEAL JOINT DISLOCATION)    Surgeons: Dick Foster MD Responsible Provider: Mikey Bourgeois DO    Anesthesia Type: general ASA Status: 2            Anesthesia Type: general    Vitals Value Taken Time   /103 01/31/24 1520   Temp 36 °C (96.8 °F) 01/31/24 1505   Pulse 79 01/31/24 1515   Resp 14 01/31/24 1515   SpO2 97 % 01/31/24 1515       Anesthesia Post Evaluation    Patient location during evaluation: bedside  Patient participation: complete - patient participated  Level of consciousness: awake and alert  Pain management: adequate  Multimodal analgesia pain management approach  Airway patency: patent  Two or more strategies used to mitigate risk of obstructive sleep apnea  Cardiovascular status: acceptable  Respiratory status: acceptable  Hydration status: acceptable  Postoperative Nausea and Vomiting: none        No notable events documented.

## 2024-01-31 NOTE — SIGNIFICANT EVENT
T admitted to PACU from OR with anesthesia and OR RN present. POC outlined.  HTN- issue. SFM in place at 8L O2. #20 left AC intact with LR infusing. Right hand and FA in splint with ace wrap intact.

## 2024-01-31 NOTE — OP NOTE
Open Reduction Internal Fixation Digit Hand (R), Repair Soft Tissue Digit Hand (R) Operative Note     Date: 2024  OR Location: [facility]    Name: Kashif Perdomo, : 1975, Age: 48 y.o., MRN: [id number], Sex: male    Diagnosis  Pre-op Diagnosis     * Dislocation of proximal interphalangeal joint of right little finger, initial encounter [T30.058A] Post-op Diagnosis     * Dislocation of proximal interphalangeal joint of right little finger, initial encounter [S63.286A]     Procedures: Open reduction right small finger PIP joint dislocation with repair of volar plate  Open Reduction Internal Fixation Digit Hand  44181 - NE OPEN TX INTERPHALANGEAL JOINT DISLOCATION    Repair Soft Tissue Digit Hand  65554 - NE RPR & RCNSTJ FINGER VOLAR PLATE INTERPHALANGEAL      Surgeons      * Dick Foster - Primary    Resident/Fellow/Other Assistant:  Surgeon(s) and Role: Reynaldo    Procedure Summary  Anesthesia: Monitor Anesthesia Care  ASA: II  Anesthesia Staff: Anesthesiologist: Mikey Bourgeois DO  C-AA: LALO Castellanos  Estimated Blood Loss: 2mL  Intra-op Medications: Administrations occurring from 1400 to 1545 on 24:  * No intraprocedure medications in log *           Anesthesia Record               Intraprocedure I/O Totals          Intake    Ketamine 0.00 mL    The total shown is the total volume documented since Anesthesia Start was filed.    Propofol Drip 0.00 mL    The total shown is the total volume documented since Anesthesia Start was filed.    Total Intake 0 mL       Output    Est. Blood Loss 1 mL    Total Output 1 mL       Net    Net Volume -1 mL          Specimen: No specimens collected     Staff:   Circulator: Pat Foster RN  Scrub Person: Madelin Saab         Drains and/or Catheters: * None in log *    Tourniquet Times:   * Missing tourniquet times found for documented tourniquets in lo *     Implants: 0.045 kwire x1    Findings: Chronic dislocation right small  finger    Indications: Kashif Perdomo is an 48 y.o. male who is having surgery for RIGHT SMALL FINGER CHRONIC PROXIMAL INTERPHALANGEAL JOINT DISLOCATION.  48-year-old male who had a chronic right small finger PIP joint dislocation of at least 3 months of age.  He was discussed with patient he likely benefit from open reduction of the small finger PIP joint with possible volar plate repair.  Risks and benefits of surgery were further discussed including but not limited to bleeding infection damage to blood vessels nerves veins tendons residual finger pain stiffness early arthritis instability dislocation failure of fixation and need for repeat surgical procedures.  Patient agreeable and wished to proceed.      The patient was seen in the preoperative area. The risks, benefits, complications, treatment options, non-operative alternatives, expected recovery and outcomes were discussed with the patient. The possibilities of reaction to medication, pulmonary aspiration, injury to surrounding structures, bleeding, recurrent infection, the need for additional procedures, failure to diagnose a condition, and creating a complication requiring transfusion or operation were discussed with the patient. The patient concurred with the proposed plan, giving informed consent.  The site of surgery was properly noted/marked if necessary per policy. The patient has been actively warmed in preoperative area. Preoperative antibiotics have been ordered and given within 1 hours of incision. Venous thrombosis prophylaxis are not indicated.    Procedure Details: After identification of the patient and marking of the correct operative site patient was taken to the operating room.  SCDs applied to bilateral extremities perioperative Haddox administered sedation was administered the right small finger was cleansed with alcohol and a digital block was performed with a total of 15 cc of a 50-50 mixture of 1% lidocaine and 0.25% Marcaine.   The right hand and arm were then prepped and draped in the usual sterile fashion.  After prepping and draping a volar Evangelista type incision overlying the PIP joint was then marked on the skin.  Arm was elevated exsanguinated Esmarch and tourniquet inflated to 250 mmHg.  Incision made through the skin with a 15 blade scalpel.  Semis tissues dissected tonight scissors we elevated our chevron flap up off of the flexor sheath to which it was adhered to.  Dissecting proximally we identified the radial and ulnar digital neurovascular bundles and dissected them in a proximal distal fashion past the PIP joint for identification.  Once the neurovascular bundles were freed we then made a small split along the flexor sheath at about the level of the proximal phalanx head and PIP joint starting ulnarly.  We elevated the flexor tendons and expose the joint surface the volar plate was noted to be detached and retracted proximally and was freed up for later repair.  Collateral ligament was identified and recessed from its attachment ulnarly.  Next proceeding radially sheath was then open also with a 15 blade scalpel the radial collateral ligament is also recessed at its attachment to the proximal phalanx head.  We are able to then partially gunstock the finger.  Thickened dorsal capsule was then released using a Elmer.  Some debris within the joint was removed with a small rongeur.  We are able to then place manual traction and flexion of the finger and reduced the joint.  We verified reduction with C-arm fluoroscopy..  Satisfied with this we then drilled 2 Julius needles through the attachment point of the volar plate on the middle phalanx volarly to dorsally through the skin and placed 3-0 FiberWire's through the volar plate through the Julius needles and then used these to pull the FiberWire's out dorsally on the finger.  FiberWire's were threaded through a Xeroform 4 x 4 gauze and nylon button.  While holding the finger in a  reduced position we then tied the sutures over the button to reduce and repair of the volar plate and verified this with C-arm fluoroscopy we then placed a 4 5 K wire from the middle phalanx into the proximal phalanx head as a temporary stabilizing pin.  We verified her position a centimeter fluoroscopy of the pin and final reduction and were satisfied.  We then copiously irrigated the wounds with saline solution and closed skin with 5-0 nylon suture Xeroform 4 x 4 gauze and Micky balls placed on the pin which was then trimmed sterile dressing of Xeroform 4 x 4 gauze was applied tourniquet deflated Webril a ulnar gauntlet splint and Ace wrap applied.  Patient then awakened from anesthesia and taken to the operating room to recover without complication.  I was present for the entire this procedure  Complications:  None; patient tolerated the procedure well.    Disposition: PACU - hemodynamically stable.  Condition: stable         Additional Details: none    Attending Attestation:     Dick Foster  Phone Number: [phone]

## 2024-01-31 NOTE — ANESTHESIA PREPROCEDURE EVALUATION
Patient: Kashif Perdomo    Procedure Information       Date/Time: 01/31/24 1400    Procedures:       Open Reduction Internal Fixation Digit Hand (Right: Little Finger)      Repair Soft Tissue Digit Hand (Right: Little Finger)    Location: [facility] / [facility]    Surgeons: Dick Foster MD          Past Medical History:   Diagnosis Date    Asthma     Depression     Environmental allergies     ESTELA (obstructive sleep apnea)      Past Surgical History:   Procedure Laterality Date    APPENDECTOMY      NASAL SEPTUM SURGERY      SINUS SURGERY           Relevant Problems   Anesthesia (within normal limits)      Cardiovascular   (+) HTN (hypertension)      Pulmonary   (+) Asthma   (+) ESTELA (obstructive sleep apnea)       Clinical information reviewed:    Allergies  Meds               NPO Detail:  No data recorded     Physical Exam    Airway  Mallampati: II  TM distance: >3 FB  Neck ROM: full     Cardiovascular    Dental    Pulmonary    Abdominal            Anesthesia Plan    History of general anesthesia?: yes  History of complications of general anesthesia?: no    ASA 2     general     The patient is not a current smoker.  Patient was not previously instructed to abstain from smoking on day of procedure.  Patient did not smoke on day of procedure.  Education provided regarding risk of obstructive sleep apnea.  intravenous induction   Postoperative administration of opioids is intended.  Anesthetic plan and risks discussed with patient.  Use of blood products discussed with patient who consented to blood products.    Plan discussed with CRNA and CAA.

## 2024-02-01 ASSESSMENT — PAIN SCALES - GENERAL: PAINLEVEL_OUTOF10: 3

## 2024-02-23 ENCOUNTER — EVALUATION (OUTPATIENT)
Dept: OCCUPATIONAL THERAPY | Facility: CLINIC | Age: 49
End: 2024-02-23
Payer: MEDICARE

## 2024-02-23 DIAGNOSIS — S61.206A OPEN DISLOCATION OF PROXIMAL INTERPHALANGEAL (PIP) JOINT OF RIGHT LITTLE FINGER: Primary | ICD-10-CM

## 2024-02-23 DIAGNOSIS — S63.286A DISLOCATION OF PROXIMAL INTERPHALANGEAL JOINT OF RIGHT LITTLE FINGER, INITIAL ENCOUNTER: ICD-10-CM

## 2024-02-23 DIAGNOSIS — S63.286A OPEN DISLOCATION OF PROXIMAL INTERPHALANGEAL (PIP) JOINT OF RIGHT LITTLE FINGER: Primary | ICD-10-CM

## 2024-02-23 PROCEDURE — 97110 THERAPEUTIC EXERCISES: CPT | Mod: GO | Performed by: OCCUPATIONAL THERAPIST

## 2024-02-23 PROCEDURE — 97165 OT EVAL LOW COMPLEX 30 MIN: CPT | Mod: GO | Performed by: OCCUPATIONAL THERAPIST

## 2024-02-23 PROCEDURE — L3906 WHO W/O JOINTS CF: HCPCS | Performed by: OCCUPATIONAL THERAPIST

## 2024-02-23 PROCEDURE — 97763 ORTHC/PROSTC MGMT SBSQ ENC: CPT | Mod: GO | Performed by: OCCUPATIONAL THERAPIST

## 2024-02-23 ASSESSMENT — PAIN SCALES - GENERAL: PAINLEVEL_OUTOF10: 4

## 2024-02-23 ASSESSMENT — ACTIVITIES OF DAILY LIVING (ADL): EFFECT OF PAIN ON DAILY ACTIVITIES: PER PATIENT ABLE TO COMPENSATE

## 2024-02-23 ASSESSMENT — PAIN - FUNCTIONAL ASSESSMENT: PAIN_FUNCTIONAL_ASSESSMENT: 0-10

## 2024-02-23 NOTE — PROGRESS NOTES
Occupational Therapy    Evaluation/Treatment    Patient Name: Kashif Perdomo  MRN: [id number]  : 1975  Today's Date: 24   Time Calculation  Start Time: 1020  Stop Time: 1105  Time Calculation (min): 45 min  OT Evaluation Time Entry  OT Evaluation (Low) Time Entry: 15  OT Therapeutic Procedures Time Entry  Orthotic/Prosthetic Mgmt and/or Training (Subs Encounter) Time Entry: 15  Therapeutic Exercise Time Entry: 15    Assessment:  Splint fitting well post tx.     OT Assessment Results: Decreased ADL status, Decreased upper extremity range of motion, Decreased upper extremity strength, Decreased fine motor control, Decreased IADLs  Strengths: Ability to acquire knowledge, Coping skills, Insight into problems  Plan:  Treatment Interventions: ADL retraining, UE strengthening/ROM, Neuromuscular reeducation, Compensatory technique education, Fine motor coordination activities, UE splinting  Treatment Interventions: ADL retraining, UE strengthening/ROM, Neuromuscular reeducation, Compensatory technique education, Fine motor coordination activities, UE splinting  OT Plan: 1 x week for 6 weeks  Frequency: 1 x week  Duration: 6 weeks  Onset Date: 24  Certification Period Start Date: 24  Certification Period End Date: 24  Number of Treatments Authorized: MN  Rehab Potential: Good  Plan of Care Agreement: Patient    Subjective   Current Problem:  1. Open dislocation of proximal interphalangeal (PIP) joint of right little finger        2. Dislocation of proximal interphalangeal joint of right little finger, initial encounter  Referral to Occupational Therapy    Follow Up In Occupational Therapy        General:      General  Reason for Referral: ADL impairment right hand  Referred By: Dr. Foster  Past Medical History Relevant to Rehab: dislocation 3-4 months ago; joint repair with volar plate and dorsal/volar tendon repair  Preferred Learning Style: verbal, visual,  written  Precautions:  Splinting: Referred for protective splint  Precautions Comment: no extension/avoid resistance/no wt bearing  Vital Signs:     Pain:  Pain Assessment  Pain Assessment: 0-10  Pain Score: 4  Pain Type: Acute pain  Pain Location: Finger (Comment which one)  Pain Orientation: Right  Pain Radiating Towards: right small finger  Pain Frequency: Intermittent  Clinical Progression: Gradually improving  Effect of Pain on Daily Activities: per patient able to compensate  Patient's Stated Pain Goal: No pain    Objective   Cognition:  Overall Cognitive Status: Within Functional Limits      Prior Function:  Level of [place]: Independent with ADLs and functional transfers, Independent with homemaking with ambulation  Hand Dominance: Right  IADL History:  Occupation: On disability  Type of Occupation: plays music, guitar/ sings  Leisure and Hobbies: reports boxing  ADL:  ADL Comments: UEFI 70/80   Splinting:  Location: Hand based small resting extension splint  Type: HFO  Splinting: [employer]  Splinting Education: Fitting, Precautions, Wear schedule, [place], [place]  Splinting Comments: fitting well post tx, pt indep with donning/doff  Therapy/Activity:   Reviewed care and precautions, MCP ROM; progressing with light active ROM of PIP joint; next therapy progress with ROM HEP.               Sensation:  Light Touch: No apparent deficits  Hand Function:  Hand Function  Gross Grasp: Impaired  Extremities:  ROM to be measured next tx visit due to time contraints  Outcome Measures:   UEFI 70/80    OP EDUCATION:  Education  Individual(s) Educated: Patient  Education Provided: Diagnosis & Precautions, Symptom management, Orthotics wearing schedule and precautions, Risk and benefits of OT discussed with patient or other, POC discussed and agreed upon  Home Program: AROM, Activity modification, Orthotic wearing schedule, care and precautions, Wound/scar care, Handout issued  Diagnosis and Precautions:  Wound care, joint protection with ligamentous repair  Risk and Benefits Discussed with Patient/Caregiver/Other: yes  Patient/Caregiver Demonstrated Understanding: yes  Plan of Care Discussed and Agreed Upon: yes  Patient Response to Education: Patient/Caregiver Verbalized Understanding of Information, Patient/Caregiver Performed Return Demonstration of Exercises/Activities, Patient/Caregiver Asked Appropriate Questions    Goals:  Active       OT Problem       Patient will indeply complete don/doff splint correctly; Right small finger;        Start:  02/23/24    Expected End:  04/23/24            PATIENT WILL DEMONSTRATE -15/90 PIP joint degrees RSF AROM IN extension/flexion to allow guitar playing.         Start:  02/23/24    Expected End:  04/23/24            PATIENT WILL DEMONSTRATE FULL functional  with R small finger to DPC for holding coins in palm;        Start:  02/23/24    Expected End:  04/23/24            PATIENT WILL SHOW A SIGNIFICANT CHANGE IN UEFI PATIENT REPORTED OUTCOME TOOL TO DEMONSTRATE SUBJECTIVE IMPROVEMENT       Start:  02/23/24    Expected End:  04/23/24            PATIENT WILL REPORT PAIN OF 0-1/10 DEMONSTRATING A REDUCTION OF OVERALL PAIN       Start:  02/23/24    Expected End:  04/23/24            PATIENT WILL DEMONSTRATE INDEPENDENCE IN HOME PROGRAM FOR SUPPORT OF PROGRESSION (Progressing)       Start:  02/23/24    Expected End:  04/23/24

## 2024-03-01 ENCOUNTER — CLINICAL SUPPORT (OUTPATIENT)
Dept: OCCUPATIONAL THERAPY | Facility: CLINIC | Age: 49
End: 2024-03-01
Payer: MEDICARE

## 2024-03-01 DIAGNOSIS — S63.286A DISLOCATION OF PROXIMAL INTERPHALANGEAL JOINT OF RIGHT LITTLE FINGER, INITIAL ENCOUNTER: ICD-10-CM

## 2024-03-01 PROCEDURE — 97110 THERAPEUTIC EXERCISES: CPT | Mod: GO,CO

## 2024-03-01 ASSESSMENT — PAIN - FUNCTIONAL ASSESSMENT: PAIN_FUNCTIONAL_ASSESSMENT: 0-10

## 2024-03-01 ASSESSMENT — PAIN SCALES - GENERAL: PAINLEVEL_OUTOF10: 2

## 2024-03-01 ASSESSMENT — PAIN DESCRIPTION - DESCRIPTORS: DESCRIPTORS: ACHING;SHARP

## 2024-03-01 NOTE — PROGRESS NOTES
"Occupational Therapy Treatment    Patient Name: Kashif Perdomo  MRN: [id number]  Today's Date: 3/1/2024    Time Calculation  Start Time: 0801  Stop Time: 0839  Time Calculation (min): 38 min  OT Therapeutic Procedures Time Entry  Therapeutic Exercise Time Entry: 38    Insurance:  Visit number: 2 of 6   Insurance: [employer]      Subjective   Current Problem:  1. Open dislocation of proximal interphalangeal (PIP) joint of right little finger          2. Dislocation of proximal interphalangeal joint of right little finger, initial encounter  Referral to Occupational Therapy     Follow Up In Occupational Therapy       AROM in R SF today:  MP 70/0  PIP 60/-45  DIP 3/-5       General:   General  Reason for Referral: ADL impairment right hand  Referred By: Dr. Foster  Past Medical History Relevant to Rehab: dislocation 3-4 months ago; joint repair with volar plate and dorsal/volar tendon repair  Preferred Learning Style: verbal, visual, written  Precautions:  Splinting: Referred for protective splint  Precautions Comment: no extension/avoid resistance/no wt bearing  Referred by: Dr Foster, follow up on 4/2/24    Patient reports \" I chose the button because I feel like this will benefit me better in the long run.\" Pt a musician and boxer.     Performing HEP?: Yes    Pain:  Pain Assessment  Pain Assessment: 0-10  Pain Score: 2  Pain Type: Acute pain  Pain Location: Finger (Comment which one)  Pain Orientation: Right  Pain Radiating Towards: small finger  Pain Descriptors: Aching, Sharp  Pain Frequency: Intermittent  Clinical Progression: Gradually improving  Patient's Stated Pain Goal: No pain    Objective   Physical Observation: button in place,under base of button gauze intact. Some dried blood. No active bleeding. Pt wearing protective splint , gauze wrap and coban wrap without issue. Reviewed goals and precautions, he verbalized understanding. Pt not currently using modalities, educated in importance of " use to achieve max function.   Edema: min in R SF    Sensory: intact  Numbness/Tingling: none noted today  Treatment:educated pt in wound care, performed by writer. Used alcohol swab to clean dried blood around button. Re-wrapped finger with gauze at end of session and educated pt in coban wrapping to reduce edema. Verbalized understanding. Also educated pt to leave area unwrapped when seated to allow to dry out.     Modalities:  moist heat applied to R hand x 5 min during goal review  Ice applied x 5 min at end of session to reduce edema    Therapeutic Exercise:  Pt educated in blocking exercises with focus on PIP, hold at end range.      Handout provided.       Post-tx pain:2/10, unchanged since start of session.    Assessment/Plan Pt to continue with wound care, use of modalities, blocking exercises and splint wear for protection. Pt frustrated with length of process but able to see positive long term outcome. Motivated to return to function and leisure act.         OP EDUCATION:  Education  Individual(s) Educated: Patient  Education Provided: Diagnosis & Precautions, Symptom management, Orthotics wearing schedule and precautions, Risk and benefits of OT discussed with patient or other, POC discussed and agreed upon  Home Program: AROM, Activity modification, Orthotic wearing schedule, care and precautions, Wound/scar care, Handout issued  Diagnosis and Precautions: Wound care, joint protection with ligamentous repair  Risk and Benefits Discussed with Patient/Caregiver/Other: yes  Patient/Caregiver Demonstrated Understanding: yes  Plan of Care Discussed and Agreed Upon: yes  Patient Response to Education: Patient/Caregiver Verbalized Understanding of Information, Patient/Caregiver Performed Return Demonstration of Exercises/Activities, Patient/Caregiver Asked Appropriate Questions    Goals:  Active       OT Problem       Patient will indeply complete don/doff splint correctly; Right small finger;  (Met)        Start:  02/23/24    Expected End:  04/23/24    Resolved:  03/01/24         PATIENT WILL DEMONSTRATE -15/90 PIP joint degrees RSF AROM IN extension/flexion to allow guitar playing.   (Progressing)       Start:  02/23/24    Expected End:  04/23/24            PATIENT WILL DEMONSTRATE FULL functional  with R small finger to DPC for holding coins in palm;  (Progressing)       Start:  02/23/24    Expected End:  04/23/24            PATIENT WILL SHOW A SIGNIFICANT CHANGE IN UEFI PATIENT REPORTED OUTCOME TOOL TO DEMONSTRATE SUBJECTIVE IMPROVEMENT (Progressing)       Start:  02/23/24    Expected End:  04/23/24            PATIENT WILL REPORT PAIN OF 0-1/10 DEMONSTRATING A REDUCTION OF OVERALL PAIN (Progressing)       Start:  02/23/24    Expected End:  04/23/24            PATIENT WILL DEMONSTRATE INDEPENDENCE IN HOME PROGRAM FOR SUPPORT OF PROGRESSION (Progressing)       Start:  02/23/24    Expected End:  04/23/24

## 2024-03-06 ENCOUNTER — OFFICE VISIT (OUTPATIENT)
Dept: OTOLARYNGOLOGY | Facility: CLINIC | Age: 49
End: 2024-03-06
Payer: MEDICARE

## 2024-03-06 VITALS — WEIGHT: 257 LBS | HEIGHT: 71 IN | BODY MASS INDEX: 35.98 KG/M2

## 2024-03-06 DIAGNOSIS — J30.9 ALLERGIC RHINITIS, UNSPECIFIED SEASONALITY, UNSPECIFIED TRIGGER: Primary | ICD-10-CM

## 2024-03-06 PROCEDURE — 99203 OFFICE O/P NEW LOW 30 MIN: CPT | Performed by: OTOLARYNGOLOGY

## 2024-03-06 PROCEDURE — 31231 NASAL ENDOSCOPY DX: CPT | Performed by: OTOLARYNGOLOGY

## 2024-03-06 PROCEDURE — 1036F TOBACCO NON-USER: CPT | Performed by: OTOLARYNGOLOGY

## 2024-03-06 RX ORDER — PERPHENAZINE 4 MG
TABLET ORAL
COMMUNITY

## 2024-03-06 NOTE — PROGRESS NOTES
HPI  Kashif Perdomo is a 48 y.o. male history of endoscopic sinus surgery around 2016.  History of allergies.  History of immunotherapy but he did not think it helped very much.  He is on Flonase and Astelin.  Here to reestablish care.  Last seen by Dr. Miles 2018.      Past Medical History:   Diagnosis Date    Asthma     Depression     Environmental allergies     ESTELA (obstructive sleep apnea)             Medications:     Current Outpatient Medications:     albuterol 90 mcg/actuation inhaler, Inhale 2 puffs every 4 hours if needed for other or wheezing., Disp: , Rfl:     ascorbic acid (Vitamin C) 1,000 mg tablet, Take 1 tablet (1,000 mg) by mouth once daily., Disp: , Rfl:     azelastine (Astelin) 137 mcg (0.1 %) nasal spray, Administer 1 spray into each nostril 2 times a day. Use in each nostril as directed, Disp: , Rfl:     B complex-vitamin C-folic acid (Nephro-Dwight Rx) 1- mg-mg-mcg tablet, Take 1 tablet by mouth once daily with breakfast., Disp: , Rfl:     calcium-vits D8-X-I6-minerals 166.75 mg- 166.75 unit capsule, Take 1 capsule by mouth once daily., Disp: , Rfl:     cetirizine (ZyrTEC) 10 mg tablet, Take 1 tablet (10 mg) by mouth once daily as needed for allergies., Disp: , Rfl:     co-enzyme Q-10 30 mg capsule, Take 1 capsule (30 mg) by mouth once daily., Disp: , Rfl:     collagen-biotin-ascorbic acid (Collagen 1500 Plus C) 500 mg-800 mcg- 50 mg capsule, Take by mouth., Disp: , Rfl:     EPINEPHrine 0.3 mg/0.3 mL injection syringe, Inject 0.3 mL (0.3 mg) into the muscle 1 time if needed for anaphylaxis., Disp: , Rfl:     fluticasone (Flonase) 50 mcg/actuation nasal spray, Administer 2 sprays into each nostril once daily., Disp: , Rfl:     fluticasone furoate-vilanteroL (Breo Ellipta) 200-25 mcg/dose inhaler, Inhale 1 puff 2 times a week., Disp: , Rfl:     GARLIC EXTRACT ORAL, Take by mouth., Disp: , Rfl:     GINKGO BILOBA EXTRACT ORAL, Take 1 capsule by mouth once daily., Disp: , Rfl:     ginseng  "100 mg capsule, Take 1 capsule by mouth once daily., Disp: , Rfl:     ibuprofen 600 mg tablet, Take 1 tablet (600 mg) by mouth 4 times a day as needed for mild pain (1 - 3) (pain)., Disp: 30 tablet, Rfl: 0    montelukast (Singulair) 10 mg tablet, Take 1 tablet (10 mg) by mouth once daily at bedtime., Disp: , Rfl:     multivitamin tablet, Take 1 tablet by mouth once daily., Disp: , Rfl:     omega 3-dha-epa-fish oil (Fish OiL) 1,000 mg (120 mg-180 mg) capsule, Take 1 capsule (1,000 mg) by mouth once daily., Disp: , Rfl:     oxyCODONE-acetaminophen (Percocet) 5-325 mg tablet, Take 1 tablet by mouth every 6 hours as needed for severe pain (7 - 10)., Disp: 20 tablet, Rfl: 0    potassium iodide 65 mg tablet, Take 1 capsule by mouth once daily., Disp: , Rfl:     saw palmetto 160 mg capsule, Take 1 capsule (160 mg) by mouth 2 times a day., Disp: , Rfl:     turmeric 400 mg capsule, Take 1 Capful by mouth once daily., Disp: , Rfl:      Allergies:  Allergies   Allergen Reactions    Dog Dander Hives and Unknown    Grass Pollen Other     Nose runs, eye swelling    House Dust Mite Unknown     Nose runs    Mold Unknown     Nose runs    Pollen, Micronized Unknown     Nose runs    Prednisone Anxiety        Physical Exam:  Last Recorded Vitals  Height 1.803 m (5' 11\"), weight 117 kg (257 lb).  General:     General appearance: Well-developed, well-nourished in no acute distress.       Voice:  normal       Head/face: Normal appearance; nontender to palpation     Facial nerve function: Normal and symmetric bilaterally.    Oral/oropharynx:     Oral vestibule: Normal labial and gingival mucosa     Tongue/floor of mouth: Normal without lesion     Oropharynx: Clear.  No lesions present of the hard/soft palate, posterior pharynx    Neck:     Neck: Normal appearance, trachea midline     Salivary glands: Normal to palpation bilaterally     Lymph nodes: No cervical lymphadenopathy to palpation     Thyroid: No thyromegaly.  No palpable nodules   "   Range of motion: Normal    Neurological:     Cortical functions: Alert and oriented x3, appropriate affect       Larynx/hypopharynx:     Laryngeal findings: Mirror exam inadequate or limited secondary to enlarged base of tongue and/or excessive gagging    Ear:     Ear canal: Normal bilaterally     Tympanic membrane: Intact and mobile bilaterally     Pinna: Normal bilaterally     Hearing:  Gross hearing assessment normal by voice    Nose:     Visualized using: Flexible nasal endoscopy utilized secondary to inadequate anterior rhinoscopy  Nasopharynx: Inadequate mirror examination as above, endoscopy demonstrates normal nasopharynx without obstruction or mass       Nasal dorsum: Nontraumatic midline appearance     Septum: Midline     Inferior turbinates: Normally sized     Mucosa: Bilateral, pink, normal appearing.  No pus or polyps.  Postoperative      Assessment/Plan   Physical exam looks good.  He will continue his nasal sprays.  May experiment with oral antihistamines which she has tried in the past.  Recheck in a year, sooner as needed         Vinnie Santos MD

## 2024-03-08 ENCOUNTER — TREATMENT (OUTPATIENT)
Dept: OCCUPATIONAL THERAPY | Facility: CLINIC | Age: 49
End: 2024-03-08
Payer: MEDICARE

## 2024-03-08 DIAGNOSIS — S61.206A OPEN DISLOCATION OF PROXIMAL INTERPHALANGEAL (PIP) JOINT OF RIGHT LITTLE FINGER: Primary | ICD-10-CM

## 2024-03-08 DIAGNOSIS — S63.286A DISLOCATION OF PROXIMAL INTERPHALANGEAL JOINT OF RIGHT LITTLE FINGER, INITIAL ENCOUNTER: ICD-10-CM

## 2024-03-08 DIAGNOSIS — S63.286A OPEN DISLOCATION OF PROXIMAL INTERPHALANGEAL (PIP) JOINT OF RIGHT LITTLE FINGER: Primary | ICD-10-CM

## 2024-03-08 PROCEDURE — 97763 ORTHC/PROSTC MGMT SBSQ ENC: CPT | Mod: GO | Performed by: OCCUPATIONAL THERAPIST

## 2024-03-08 PROCEDURE — 97140 MANUAL THERAPY 1/> REGIONS: CPT | Mod: GO | Performed by: OCCUPATIONAL THERAPIST

## 2024-03-08 PROCEDURE — 97110 THERAPEUTIC EXERCISES: CPT | Mod: GO | Performed by: OCCUPATIONAL THERAPIST

## 2024-03-08 ASSESSMENT — PAIN - FUNCTIONAL ASSESSMENT: PAIN_FUNCTIONAL_ASSESSMENT: 0-10

## 2024-03-08 ASSESSMENT — PAIN SCALES - GENERAL: PAINLEVEL_OUTOF10: 2

## 2024-03-08 NOTE — PROGRESS NOTES
Occupational Therapy    Treatment    Patient Name: Kashif Perdomo  MRN: [id number]  : 1975  Today's Date: 24Time Calculation  Start Time: 1150  Stop Time: 1230  Time Calculation (min): 40 min  OT Therapeutic Procedures Time Entry  Manual Therapy Time Entry: 10  Orthotic/Prosthetic Mgmt and/or Training (Subs Encounter) Time Entry: 10  Therapeutic Exercise Time Entry: 20    Insurance:  Visit number: 3 of 6  [place]ization info: [place]  [place] Type: Payor: T MEDICARE / Plan: AETNA MEDICARE ASSURE / Product Type: *No Product type* /     Assessment:  Pt healing well with PIP ROM improving; compliant with splinting and HEP, Dr. Foster consulted due to looseness of suture, button removed, pt at 5 weeks s/p repair of Right SF PIP joint dislocation;      OT Assessment Results: Decreased ADL status, Decreased upper extremity range of motion, Decreased upper extremity strength, Decreased fine motor control, Decreased IADLs  Plan:     OT Plan: 1 x week for 6 weeks  Duration: 6 weeks  Onset Date: 24  Certification Period Start Date: 24  Certification Period End Date: 24  Number of Treatments Authorized: [place]  Rehab Potential: Good  Plan of Care Agreement: Patient    Subjective   Current Problem:  1. Open dislocation of proximal interphalangeal (PIP) joint of right little finger        2. Dislocation of proximal interphalangeal joint of right little finger, initial encounter  Follow Up In Occupational Therapy        General:      General  Reason for Referral: ADL impairment right hand  Referred By: Dr. Foster  Past Medical History Relevant to Rehab: dislocation RSF PIP joint 3-4 months ago; joint repair with volar plate and dorsal/volar tendon repair  Preferred Learning Style: verbal, visual, written  Precautions:  Splinting: continue splint for protection; removal for ROM exercises and light use  Precautions Comment: no extension/avoid resistance/no wt bearing     Pain:  Pain  Assessment  Pain Assessment: 0-10  Pain Score: 2  Pain Location: Finger (Comment which one)  Pain Orientation: Right  Pain Frequency: Intermittent  Clinical Progression: Gradually improving  Patient's Stated Pain Goal: No pain    Objective    Splinting:  Location: readjusted with new strap  Splinting: Adjustment, Skin check  Splinting Education: Fitting, Precautions, Wear schedule, [place], [place]  Therapy/Activity: Therapeutic Exercise  Therapeutic Exercise Performed: Yes  Therapeutic Exercise Activity 1: PIP joint flexion with AAROM and PROM with light pressure with assist only in flexion, no extension; pt demonstrates and completed 10 reps;  Therapeutic Exercise Activity 2: Pt instructed and completed 10 reps of DIP flexion with PIP support/immobilization, next visit provide PIP block; light dressing this date; Reviewed care and precautions, MCP ROM; progressing with light active ROM of PIP joint;        Manual Therapy  Manual Therapy Performed: Yes  Manual Therapy Activity 1: Instructed pt with volar scar massage and avoid salves, creams or ointments to dorsal button removal site (this date by Dr. Foster); wash normally when closed in 24 hrs. Pt verbalizes good understanding and compliance, light dressing applied.     Extremities:  ROM   AROM right small finger   MCP  0/75   PIP  -45/65  DIP 0/15  WEBB 110 degrees; passive greater than active;   (next tx provide PIP stabilization for DIP flexion and dorsal block, figure-8 or use of finger splint)    Outcome Measures:   UEFI 70/80    OP EDUCATION:  Education  Individual(s) Educated: Patient  Education Provided: Diagnosis & Precautions, Symptom management, Orthotics wearing schedule and precautions, Risk and benefits of OT discussed with patient or other, POC discussed and agreed upon  Home Program: AROM, Activity modification, Orthotic wearing schedule, care and precautions, Wound/scar care, Handout issued  Diagnosis and Precautions: Wound care, joint  protection with ligamentous repair  Risk and Benefits Discussed with Patient/Caregiver/Other: yes  Patient/Caregiver Demonstrated Understanding: yes  Plan of Care Discussed and Agreed Upon: yes  Patient Response to Education: Patient/Caregiver Verbalized Understanding of Information, Patient/Caregiver Performed Return Demonstration of Exercises/Activities, Patient/Caregiver Asked Appropriate Questions    Goals:  Active       OT Problem       Patient will indeply complete don/doff splint correctly; Right small finger;  (Met)       Start:  02/23/24    Expected End:  04/23/24    Resolved:  03/01/24         PATIENT WILL DEMONSTRATE -15/90 PIP joint degrees RSF AROM IN extension/flexion to allow guitar playing.   (Progressing)       Start:  02/23/24    Expected End:  04/23/24            PATIENT WILL DEMONSTRATE FULL functional  with R small finger to DPC for holding coins in palm;  (Progressing)       Start:  02/23/24    Expected End:  04/23/24            PATIENT WILL SHOW A SIGNIFICANT CHANGE IN UEFI PATIENT REPORTED OUTCOME TOOL TO DEMONSTRATE SUBJECTIVE IMPROVEMENT (Progressing)       Start:  02/23/24    Expected End:  04/23/24            PATIENT WILL REPORT PAIN OF 0-1/10 DEMONSTRATING A REDUCTION OF OVERALL PAIN (Progressing)       Start:  02/23/24    Expected End:  04/23/24            PATIENT WILL DEMONSTRATE INDEPENDENCE IN HOME PROGRAM FOR SUPPORT OF PROGRESSION (Progressing)       Start:  02/23/24    Expected End:  04/23/24

## 2024-03-15 ENCOUNTER — TREATMENT (OUTPATIENT)
Dept: OCCUPATIONAL THERAPY | Facility: CLINIC | Age: 49
End: 2024-03-15
Payer: MEDICARE

## 2024-03-15 DIAGNOSIS — S63.286A DISLOCATION OF PROXIMAL INTERPHALANGEAL JOINT OF RIGHT LITTLE FINGER, INITIAL ENCOUNTER: ICD-10-CM

## 2024-03-15 DIAGNOSIS — S61.206A OPEN DISLOCATION OF PROXIMAL INTERPHALANGEAL (PIP) JOINT OF RIGHT LITTLE FINGER: Primary | ICD-10-CM

## 2024-03-15 DIAGNOSIS — S63.286A OPEN DISLOCATION OF PROXIMAL INTERPHALANGEAL (PIP) JOINT OF RIGHT LITTLE FINGER: Primary | ICD-10-CM

## 2024-03-15 PROCEDURE — 97140 MANUAL THERAPY 1/> REGIONS: CPT | Mod: GO | Performed by: OCCUPATIONAL THERAPIST

## 2024-03-15 PROCEDURE — 97530 THERAPEUTIC ACTIVITIES: CPT | Mod: GO | Performed by: OCCUPATIONAL THERAPIST

## 2024-03-15 PROCEDURE — 97110 THERAPEUTIC EXERCISES: CPT | Mod: GO | Performed by: OCCUPATIONAL THERAPIST

## 2024-03-15 ASSESSMENT — PAIN - FUNCTIONAL ASSESSMENT: PAIN_FUNCTIONAL_ASSESSMENT: 0-10

## 2024-03-15 ASSESSMENT — PAIN DESCRIPTION - DESCRIPTORS: DESCRIPTORS: TENDER

## 2024-03-15 ASSESSMENT — PAIN SCALES - GENERAL: PAINLEVEL_OUTOF10: 4

## 2024-03-15 NOTE — PROGRESS NOTES
Occupational Therapy    Treatment    Patient Name: Kashif Perdomo  MRN: [id number]  : 1975  Today's Date: 03/15/24Time Calculation  Start Time: 805  Stop Time: 845  Time Calculation (min): 40 min  OT Therapeutic Procedures Time Entry  Manual Therapy Time Entry: 15  Therapeutic Activity Time Entry: 15  Therapeutic Exercise Time Entry: 10    Insurance:  Visit number: 4 of 6  [place]ization info: [place]  Insurance Type: Payor: AET MEDICARE / Plan: AETNA MEDICARE ASSURE / Product Type: *No Product type* /     Assessment:  Pt improved PIP ROM post treatment; pt not wearing splinting and HEP 6 weeks s/p repair of Right SF PIP joint dislocation;      OT Assessment Results: Decreased ADL status, Decreased upper extremity range of motion, Decreased upper extremity strength, Decreased fine motor control, Decreased IADLs  Plan:     OT Plan: 1 x week for 6 weeks  Duration: 6 weeks  Onset Date: 24  Certification Period Start Date: 24  Certification Period End Date: 24  Number of Treatments [place]ized: [place]  Rehab Potential: Good  Plan of Care Agreement: Patient    Subjective   Current Problem:  1. Open dislocation of proximal interphalangeal (PIP) joint of right little finger        2. Dislocation of proximal interphalangeal joint of right little finger, initial encounter  Follow Up In Occupational Therapy        General:      General  Reason for Referral: ADL impairment right hand  Referred By: Dr. Foster  Past Medical History Relevant to Rehab: 24  dislocation RSF PIP joint 3-4 months ago; joint repair with volar plate and dorsal/volar tendon repair  Preferred Learning Style: verbal, visual, written  Precautions:  Splinting: resting splint/vivian trappers  Precautions Comment: no extension/avoid resistance/no wt bearing     Pain:  Pain Assessment  Pain Assessment: 0-10  Pain Score: 4  Pain Location: Finger (Comment which one)  Pain Orientation: Right  Pain Descriptors: Tender  Pain  Frequency: Intermittent  Patient's Stated Pain Goal: No pain    Objective    Splinting:  Splinting: Adjustment, Skin check  Splinting Education: Fitting, Precautions, Wear schedule, [place], [place]  Splinting Comments: may use vivian trappers  Therapy/Activity: Therapeutic Exercise  Therapeutic Exercise Performed: Yes  Therapeutic Exercise Activity 1: PIP joint flexion with AAROM and PROM with light pressure with assist only in flexion, no extension; pt demonstrates and completed 10 reps;  Therapeutic Exercise Activity 2: Performed and completed 10 x reps of DIP flexion with PIP support/immobilization,  PIP block; light dressing this date; Reviewed care and precautions, MCP ROM; progressing with light active ROM of PIP joint;   Therapeutic Activity  Therapeutic Activity Performed: Yes  Therapeutic Activity 1: Performed small foam block , beads and in palm manipulation x 20 reps  Therapeutic Activity 2: light pinch with borax putty, ; review and demonstrated precautions    Manual Therapy  Manual Therapy Performed: Yes  Manual Therapy Activity 1: Performed STM,  volar scar massage and use of coban for scar mobilization;     Extremities:  ROM   AROM right small finger   MCP  0/90   PIP  -40/70  (post tx 80)  DIP 0/15  WEBB improving degrees; passive greater than active;   (next tx provide PIP stabilization for after vivina trapper;  figure-8 splint)    Outcome Measures:   UEFI 70/80    OP EDUCATION:  Education  Individual(s) Educated: Patient  Education Provided: Diagnosis & Precautions, Symptom management, Orthotics wearing schedule and precautions, Risk and benefits of OT discussed with patient or other, POC discussed and agreed upon  Home Program: AROM, Activity modification, Orthotic wearing schedule, care and precautions, Wound/scar care, Handout issued  Diagnosis and Precautions: Wound care, joint protection with ligamentous repair  Risk and Benefits Discussed with Patient/Caregiver/Other:  yes  Patient/Caregiver Demonstrated Understanding: yes  Plan of Care Discussed and Agreed Upon: yes  Patient Response to Education: Patient/Caregiver Verbalized Understanding of Information, Patient/Caregiver Performed Return Demonstration of Exercises/Activities, Patient/Caregiver Asked Appropriate Questions    Goals:  Active       OT Problem       Patient will indeply complete don/doff splint correctly; Right small finger;  (Met)       Start:  02/23/24    Expected End:  04/23/24    Resolved:  03/01/24         PATIENT WILL DEMONSTRATE -15/90 PIP joint degrees RSF AROM IN extension/flexion to allow guitar playing.   (Progressing)       Start:  02/23/24    Expected End:  04/23/24            PATIENT WILL DEMONSTRATE FULL functional  with R small finger to DPC for holding coins in palm;  (Progressing)       Start:  02/23/24    Expected End:  04/23/24            PATIENT WILL SHOW A SIGNIFICANT CHANGE IN UEFI PATIENT REPORTED OUTCOME TOOL TO DEMONSTRATE SUBJECTIVE IMPROVEMENT (Progressing)       Start:  02/23/24    Expected End:  04/23/24            PATIENT WILL REPORT PAIN OF 0-1/10 DEMONSTRATING A REDUCTION OF OVERALL PAIN (Progressing)       Start:  02/23/24    Expected End:  04/23/24            PATIENT WILL DEMONSTRATE INDEPENDENCE IN HOME PROGRAM FOR SUPPORT OF PROGRESSION (Progressing)       Start:  02/23/24    Expected End:  04/23/24

## 2024-03-22 ENCOUNTER — TREATMENT (OUTPATIENT)
Dept: OCCUPATIONAL THERAPY | Facility: CLINIC | Age: 49
End: 2024-03-22
Payer: MEDICARE

## 2024-03-22 DIAGNOSIS — S61.206A OPEN DISLOCATION OF PROXIMAL INTERPHALANGEAL (PIP) JOINT OF RIGHT LITTLE FINGER: Primary | ICD-10-CM

## 2024-03-22 DIAGNOSIS — S63.286A OPEN DISLOCATION OF PROXIMAL INTERPHALANGEAL (PIP) JOINT OF RIGHT LITTLE FINGER: Primary | ICD-10-CM

## 2024-03-22 DIAGNOSIS — S63.286A DISLOCATION OF PROXIMAL INTERPHALANGEAL JOINT OF RIGHT LITTLE FINGER, INITIAL ENCOUNTER: ICD-10-CM

## 2024-03-22 PROCEDURE — 97140 MANUAL THERAPY 1/> REGIONS: CPT | Mod: GO | Performed by: OCCUPATIONAL THERAPIST

## 2024-03-22 PROCEDURE — 97110 THERAPEUTIC EXERCISES: CPT | Mod: GO | Performed by: OCCUPATIONAL THERAPIST

## 2024-03-22 PROCEDURE — 97530 THERAPEUTIC ACTIVITIES: CPT | Mod: GO | Performed by: OCCUPATIONAL THERAPIST

## 2024-03-22 ASSESSMENT — PAIN SCALES - GENERAL: PAINLEVEL_OUTOF10: 2

## 2024-03-22 ASSESSMENT — PAIN DESCRIPTION - DESCRIPTORS: DESCRIPTORS: TENDER

## 2024-03-22 ASSESSMENT — PAIN - FUNCTIONAL ASSESSMENT: PAIN_FUNCTIONAL_ASSESSMENT: 0-10

## 2024-03-22 NOTE — PROGRESS NOTES
Occupational Therapy    Treatment    Patient Name: Kashif Perdomo  MRN: [id number]  : 1975  Today's Date: 24Time Calculation  Start Time: 805  Stop Time: 845  Time Calculation (min): 40 min  OT Therapeutic Procedures Time Entry  Manual Therapy Time Entry: 15  Therapeutic Activity Time Entry: 15  Therapeutic Exercise Time Entry: 10    Insurance:  Visit number: 5of 6  [place]ization info: [place]  Insurance Type: Payor: AET MEDICARE / Plan: AETNA MEDICARE ASSURE / Product Type: *No Product type* /     Assessment:  Pt improved PIP ROM post treatment; pt not wearing splinting and HEP 6 weeks s/p repair of Right SF PIP joint dislocation;      OT Assessment Results: Decreased ADL status, Decreased upper extremity range of motion, Decreased upper extremity strength, Decreased fine motor control, Decreased IADLs  Plan:     OT Plan: 1 x week for 6 weeks  Duration: 6 weeks  Onset Date: 24  Certification Period Start Date: 24  Certification Period End Date: 24  Number of Treatments [place]ized: [place]  Rehab Potential: Good  Plan of Care Agreement: Patient    Subjective   Current Problem:  1. Open dislocation of proximal interphalangeal (PIP) joint of right little finger        2. Dislocation of proximal interphalangeal joint of right little finger, initial encounter  Follow Up In Occupational Therapy        General:      General  Reason for Referral: ADL impairment right hand  Referred By: Dr. Foster  Past Medical History Relevant to Rehab: 24  dislocation RSF PIP joint 3-4 months ago; joint repair with volar plate and dorsal/volar tendon repair  Preferred Learning Style: verbal, visual, written  Precautions:  Precautions Comment: no extension/avoid resistance/no wt bearing     Pain:  Pain Assessment  Pain Assessment: 0-10  Pain Score: 2  Pain Location: Finger (Comment which one)  Pain Orientation: Right  Pain Descriptors: Tender  Pain Frequency: Intermittent  Patient's Stated Pain  Goal: No pain    Objective    Splinting:  Splinting: Adjustment, Skin check  Splinting Education: Fitting, Precautions, Wear schedule, [place], [place]  Therapy/Activity: Therapeutic Exercise  Therapeutic Exercise Performed: Yes  Therapeutic Exercise Activity 1: PIP joint flexion with AAROM and PROM with light pressure with assist only in flexion, no extension; pt demonstrates and completed 10 reps;  Therapeutic Exercise Activity 2: Performed and completed 10 x reps of DIP flexion with PIP support/immobilization,  PIP block; performed with fluidotherapy and direct therapist supervision Reviewed care and precautions, MCP ROM; progressing with light active ROM of PIP joint;   Therapeutic Activity  Therapeutic Activity Performed: Yes  Therapeutic Activity 1: Performed small foam block , beads and in palm manipulation x 20 reps    Manual Therapy  Manual Therapy Performed: Yes  Manual Therapy Activity 1: Performed STM, volar scar massage and use of coban for scar mobilization;     Extremities:  ROM   AROM right small finger   MCP  0/85   PIP  -30/75  (post tx 80)  DIP 0/15  [id number]  WEBB improving degrees; passive greater than active;   (next tx provide PIP stabilization for after vivian trapper;  figure-8 splint)    Outcome Measures:   UEFI 70/80    OP EDUCATION:  Education  Individual(s) Educated: Patient  Education Provided: Diagnosis & Precautions, Symptom management, Orthotics wearing schedule and precautions, Risk and benefits of OT discussed with patient or other, POC discussed and agreed upon  Home Program: AROM, Activity modification, Orthotic wearing schedule, care and precautions, Wound/scar care, Handout issued  Diagnosis and Precautions: Wound care, joint protection with ligamentous repair  Risk and Benefits Discussed with Patient/Caregiver/Other: yes  Patient/Caregiver Demonstrated Understanding: yes  Plan of Care Discussed and Agreed Upon: yes  Patient Response to Education: Patient/Caregiver  Verbalized Understanding of Information, Patient/Caregiver Performed Return Demonstration of Exercises/Activities, Patient/Caregiver Asked Appropriate Questions    Goals:  Active       OT Problem       Patient will indeply complete don/doff splint correctly; Right small finger;  (Met)       Start:  02/23/24    Expected End:  04/23/24    Resolved:  03/01/24         PATIENT WILL DEMONSTRATE -15/90 PIP joint degrees RSF AROM IN extension/flexion to allow guitar playing.   (Progressing)       Start:  02/23/24    Expected End:  04/23/24            PATIENT WILL DEMONSTRATE FULL functional  with R small finger to DPC for holding coins in palm;  (Progressing)       Start:  02/23/24    Expected End:  04/23/24            PATIENT WILL SHOW A SIGNIFICANT CHANGE IN UEFI PATIENT REPORTED OUTCOME TOOL TO DEMONSTRATE SUBJECTIVE IMPROVEMENT (Progressing)       Start:  02/23/24    Expected End:  04/23/24            PATIENT WILL REPORT PAIN OF 0-1/10 DEMONSTRATING A REDUCTION OF OVERALL PAIN (Progressing)       Start:  02/23/24    Expected End:  04/23/24            PATIENT WILL DEMONSTRATE INDEPENDENCE IN HOME PROGRAM FOR SUPPORT OF PROGRESSION (Progressing)       Start:  02/23/24    Expected End:  04/23/24

## 2024-03-29 ENCOUNTER — TREATMENT (OUTPATIENT)
Dept: OCCUPATIONAL THERAPY | Facility: CLINIC | Age: 49
End: 2024-03-29
Payer: MEDICARE

## 2024-03-29 DIAGNOSIS — S63.286A DISLOCATION OF PROXIMAL INTERPHALANGEAL JOINT OF RIGHT LITTLE FINGER, INITIAL ENCOUNTER: ICD-10-CM

## 2024-03-29 PROCEDURE — 97110 THERAPEUTIC EXERCISES: CPT | Mod: GO,CO

## 2024-03-29 PROCEDURE — 97022 WHIRLPOOL THERAPY: CPT | Mod: GO,CO

## 2024-03-29 ASSESSMENT — PAIN DESCRIPTION - DESCRIPTORS: DESCRIPTORS: OTHER (COMMENT)

## 2024-03-29 ASSESSMENT — PAIN - FUNCTIONAL ASSESSMENT: PAIN_FUNCTIONAL_ASSESSMENT: 0-10

## 2024-03-29 ASSESSMENT — PAIN SCALES - GENERAL: PAINLEVEL_OUTOF10: 0 - NO PAIN

## 2024-03-29 NOTE — PROGRESS NOTES
"Occupational Therapy Treatment    Patient Name: Kashif Perdomo  MRN: [id number]  Today's Date: 3/29/2024    Time Calculation  Start Time: 0758  Stop Time: 0850  Time Calculation (min): 52 min  OT Modalities Time Entry  [place] Time Entry: 10  OT Therapeutic Procedures Time Entry  Therapeutic Exercise Time Entry: 42  Insurance:  Visit number: 6 of 6, visits extended to include 4 more to meet established goals.   Insurance: [employer]      Subjective   Current Problem:  1. Open dislocation of proximal interphalangeal (PIP) joint of right little finger          2. Dislocation of proximal interphalangeal joint of right little finger, initial encounter  Referral to Occupational Therapy     Follow Up In Occupational Therapy           General:   General  Reason for Referral: ADL impairment right hand  Referred By: Dr. Foster  Past Medical History Relevant to Rehab: dislocation 3-4 months ago; joint repair with volar plate and dorsal/volar tendon repair  Preferred Learning Style: verbal, visual, written  Precautions:  Splinting: Referred for protective splint  Precautions Comment: no extension/avoid resistance/no wt bearing  Referred by: Dr Foster, follow up on 4/2/24    Patient reports \" It's going ok. I am not wearing the splint at night because my cat won't leave it alone. But I am wearing the other splint for exercise. \" Pt a musician and boxer.     Performing HEP?: Yes    Pain:0/10  Pain Assessment  Pain Assessment: 0-10  Pain Score: 0 - No pain  Pain Location: Finger (Comment which one)  Pain Orientation: Right (small finger)  Pain Descriptors: Other (Comment) (bloated)  Pain Frequency: Intermittent  Patient's Stated Pain Goal: No pain    Objective     Physical Observation: Edema: min/trace(down from Min) in R SF at PIP     Sensory: Impaired  Numbness/Tingling:  noted today at tip of SF  Treatment:  Modalities:   Fluidoto R hand x 10 min during goal review and AROM to promote muscle movement during " session.   Written Home made ice pack and ice pack recipe provided for home use    Therapeutic Exercise:  Educated pt in retrograde massage to reduce edema, performed by writer.   Pt completed  hand held blocking exercises with focus on PIP, hold at end range x 5 sec x 15 reps  Writer adjusted padding in splint for SF exercise completion to accommodate increased edema.  Pt completed 15 reps of DIP flexion and ext with 5 sec hold at end range  Writer replaced strapping and padding on night time  SF ext splint due to soiling, provided with cotton sleeve to cover splint at night and increase probability of wearing.   Reviewed coban wrapping for edema control, completed by writer at end of session.     Post-tx pain:0/10, unchanged since start of session.    Assessment/Plan Pt to continue with edema control, use of modalities, blocking exercises and splint wear for protection. Motivated to return to function and leisure act.     OP EDUCATION:  Education  Individual(s) Educated: Patient  Education Provided: Diagnosis & Precautions, Symptom management, Orthotics wearing schedule and precautions, Risk and benefits of OT discussed with patient or other, POC discussed and agreed upon  Home Program: AROM, Activity modification, Orthotic wearing schedule, care and precautions, Wound/scar care, Handout issued  Diagnosis and Precautions: Wound care, joint protection with ligamentous repair  Risk and Benefits Discussed with Patient/Caregiver/Other: yes  Patient/Caregiver Demonstrated Understanding: yes  Plan of Care Discussed and Agreed Upon: yes  Patient Response to Education: Patient/Caregiver Verbalized Understanding of Information, Patient/Caregiver Performed Return Demonstration of Exercises/Activities, Patient/Caregiver Asked Appropriate Questions    Goals:  Active       OT Problem       Patient will indeply complete don/doff splint correctly; Right small finger;  (Met)       Start:  02/23/24    Expected End:  04/23/24     Resolved:  03/01/24         PATIENT WILL DEMONSTRATE -15/90 PIP joint degrees RSF AROM IN extension/flexion to allow guitar playing.   (Progressing)       Start:  02/23/24    Expected End:  04/23/24            PATIENT WILL DEMONSTRATE FULL functional  with R small finger to DPC for holding coins in palm;  (Progressing)       Start:  02/23/24    Expected End:  04/23/24            PATIENT WILL SHOW A SIGNIFICANT CHANGE IN UEFI PATIENT REPORTED OUTCOME TOOL TO DEMONSTRATE SUBJECTIVE IMPROVEMENT (Progressing)       Start:  02/23/24    Expected End:  04/23/24            PATIENT WILL REPORT PAIN OF 0-1/10 DEMONSTRATING A REDUCTION OF OVERALL PAIN (Progressing)       Start:  02/23/24    Expected End:  04/23/24            PATIENT WILL DEMONSTRATE INDEPENDENCE IN HOME PROGRAM FOR SUPPORT OF PROGRESSION (Progressing)       Start:  02/23/24    Expected End:  04/23/24

## 2024-04-02 ENCOUNTER — TREATMENT (OUTPATIENT)
Dept: OCCUPATIONAL THERAPY | Facility: CLINIC | Age: 49
End: 2024-04-02
Payer: MEDICARE

## 2024-04-02 DIAGNOSIS — S63.286A OPEN DISLOCATION OF PROXIMAL INTERPHALANGEAL (PIP) JOINT OF RIGHT LITTLE FINGER: Primary | ICD-10-CM

## 2024-04-02 DIAGNOSIS — S61.206A OPEN DISLOCATION OF PROXIMAL INTERPHALANGEAL (PIP) JOINT OF RIGHT LITTLE FINGER: Primary | ICD-10-CM

## 2024-04-02 PROCEDURE — 97763 ORTHC/PROSTC MGMT SBSQ ENC: CPT | Mod: GO | Performed by: OCCUPATIONAL THERAPIST

## 2024-04-02 NOTE — PROGRESS NOTES
Occupational Therapy    OT Treatment    Patient Name: Kashif Perdomo  MRN: [id number]  Today's Date: 4/2/2024     Time Calculation  Start Time: 1104  Stop Time: 1125  Time Calculation (min): 21 min    Time:  Time Calculation  Start Time: 1104  Stop Time: 1125  Time Calculation (min): 21 min  OT Therapeutic Procedures Time Entry  Orthotic/Prosthetic Mgmt and/or Training (Subs Encounter) Time Entry: 21    Insurance:  Visit number: 7 of 10  Authorization info: [employer]are  Insurance Type: Payor: AETNA MEDICARE / Plan: AETNA MEDICARE ASSURE / Product Type: *No Product type* /     Assessment: Pt presented to office as walk over from physician office for dynamic PIP extension splint to right small finger this date; fitted issued and instructed with care and precautions and wearing schedule, pt verbalized good understanding and indep with don/doff; [address]         Plan: Continue scheduled POC and follow up for goals and tx plan;           Goals:  Active       OT Problem       Patient will indeply complete don/doff splint correctly; Right small finger;  (Met)       Start:  02/23/24    Expected End:  04/23/24    Resolved:  03/01/24         PATIENT WILL DEMONSTRATE -15/90 PIP joint degrees RSF AROM IN extension/flexion to allow guitar playing.   (Progressing)       Start:  02/23/24    Expected End:  04/23/24            PATIENT WILL DEMONSTRATE FULL functional  with R small finger to DPC for holding coins in palm;  (Progressing)       Start:  02/23/24    Expected End:  04/23/24            PATIENT WILL SHOW A SIGNIFICANT CHANGE IN UEFI PATIENT REPORTED OUTCOME TOOL TO DEMONSTRATE SUBJECTIVE IMPROVEMENT (Progressing)       Start:  02/23/24    Expected End:  04/23/24            PATIENT WILL REPORT PAIN OF 0-1/10 DEMONSTRATING A REDUCTION OF OVERALL PAIN (Progressing)       Start:  02/23/24    Expected End:  04/23/24            PATIENT WILL DEMONSTRATE INDEPENDENCE IN HOME PROGRAM FOR SUPPORT OF PROGRESSION (Progressing)        Start:  02/23/24    Expected End:  04/23/24

## 2024-04-05 ENCOUNTER — TREATMENT (OUTPATIENT)
Dept: OCCUPATIONAL THERAPY | Facility: CLINIC | Age: 49
End: 2024-04-05
Payer: MEDICARE

## 2024-04-05 DIAGNOSIS — S63.286A OPEN DISLOCATION OF PROXIMAL INTERPHALANGEAL (PIP) JOINT OF RIGHT LITTLE FINGER: Primary | ICD-10-CM

## 2024-04-05 DIAGNOSIS — S63.286A DISLOCATION OF PROXIMAL INTERPHALANGEAL JOINT OF RIGHT LITTLE FINGER, INITIAL ENCOUNTER: ICD-10-CM

## 2024-04-05 DIAGNOSIS — S61.206A OPEN DISLOCATION OF PROXIMAL INTERPHALANGEAL (PIP) JOINT OF RIGHT LITTLE FINGER: Primary | ICD-10-CM

## 2024-04-05 PROCEDURE — 97110 THERAPEUTIC EXERCISES: CPT | Mod: GO | Performed by: OCCUPATIONAL THERAPIST

## 2024-04-05 PROCEDURE — 97140 MANUAL THERAPY 1/> REGIONS: CPT | Mod: GO | Performed by: OCCUPATIONAL THERAPIST

## 2024-04-05 PROCEDURE — 97530 THERAPEUTIC ACTIVITIES: CPT | Mod: GO | Performed by: OCCUPATIONAL THERAPIST

## 2024-04-05 ASSESSMENT — PAIN SCALES - GENERAL: PAINLEVEL_OUTOF10: 0 - NO PAIN

## 2024-04-05 ASSESSMENT — PAIN DESCRIPTION - DESCRIPTORS: DESCRIPTORS: SORE

## 2024-04-05 ASSESSMENT — PAIN - FUNCTIONAL ASSESSMENT: PAIN_FUNCTIONAL_ASSESSMENT: 0-10

## 2024-04-05 NOTE — PROGRESS NOTES
Dr. Szymanski See notified of patients Na level. See new orders. Occupational Therapy    Treatment    Patient Name: Kashif Perdomo  MRN: [id number]  : 1975  Today's Date: 24Time Calculation  Start Time: 803  Stop Time: 844  Time Calculation (min): 41 min  OT Therapeutic Procedures Time Entry  Manual Therapy Time Entry: 15  Therapeutic Activity Time Entry: 15  Therapeutic Exercise Time Entry: 11    Insurance:  Visit number: 8 of 12  Authorization info: [place]  Insurance Type: Payor: [employer] MEDICARE / Plan: [employer] MEDICARE ASSURE / Product Type: *No Product type* /     Assessment:  Pt tolerating dynamic splint well, continue with PIP ROM, tolerates splinting and HEP 7 weeks s/p repair of Right SF PIP joint dislocation;      OT Assessment Results: Decreased ADL status, Decreased upper extremity range of motion, Decreased upper extremity strength, Decreased fine motor control, Decreased IADLs  Plan:     OT Plan: 1 x week for 6 weeks  Duration: 6 weeks  Onset Date: 24  Certification Period Start Date: 24  Certification Period End Date: 24  Number of Treatments Authorized: [place]  Rehab Potential: Good  Plan of Care Agreement: Patient    Subjective   Current Problem:  1. Open dislocation of proximal interphalangeal (PIP) joint of right little finger        2. Dislocation of proximal interphalangeal joint of right little finger, initial encounter  Follow Up In Occupational Therapy        General:      General  Reason for Referral: ADL impairment right hand  Referred By: Dr. Foster  Past Medical History Relevant to Rehab: 24  dislocation RSF PIP joint 3-4 months ago; joint repair with volar plate and dorsal/volar tendon repair  Preferred Learning Style: verbal, visual, written  Precautions:  Precautions Comment: no extension/avoid resistance/no wt bearing     Pain:  Pain Assessment  Pain Assessment: 0-10  Pain Score: 0 - No pain  Pain Descriptors: Sore  Pain Frequency: Intermittent  Patient's Stated Pain Goal: No pain    Objective     Splinting:  Splinting: Adjustment, Skin check  Splinting Education: Fitting, Precautions, Wear schedule, [place], [place]  Therapy/Activity: Therapeutic Exercise  Therapeutic Exercise Performed: Yes  Therapeutic Exercise Activity 1: PIP joint flexion with AAROM and PROM with light pressure with assist only in flexion, no extension; pt demonstrates and completed 10 reps;  Therapeutic Exercise Activity 2: Performed and completed 10 x reps of DIP flexion with PIP support/immobilization,  PIP block; performed with fluidotherapy and direct therapist supervision Reviewed care and precautions, MCP ROM; progressing with light active ROM of PIP joint;   Therapeutic Activity  Therapeutic Activity Performed: Yes  Therapeutic Activity 1: Performed small foam block , beads and in palm manipulation x 20 reps  Therapeutic Activity 2:  pinch with lg clothespin adjusted length/resistance completed 25     Manual Therapy  Manual Therapy Performed: Yes  Manual Therapy Activity 1: Performed STM, volar scar massage and use of coban for scar mobilization;     Extremities:  ROM   AROM right small finger   MCP  0/85   PIP  -30/75  (post tx 80)  DIP 0/15  [id number]  WEBB improving degrees; passive greater than active;   (next tx provide PIP stabilization for after vivian trapper;  figure-8 splint)    Outcome Measures:   UEFI 70/80    OP EDUCATION:  Education  Individual(s) Educated: Patient  Education Provided: Diagnosis & Precautions, Symptom management, Orthotics wearing schedule and precautions, Risk and benefits of OT discussed with patient or other, POC discussed and agreed upon  Home Program: AROM, Activity modification, Orthotic wearing schedule, care and precautions, Wound/scar care, Handout issued  Diagnosis and Precautions: Wound care, joint protection with ligamentous repair  Risk and Benefits Discussed with Patient/Caregiver/Other: yes  Patient/Caregiver Demonstrated Understanding: yes  Plan of Care Discussed and  Agreed Upon: yes  Patient Response to Education: Patient/Caregiver Verbalized Understanding of Information, Patient/Caregiver Performed Return Demonstration of Exercises/Activities, Patient/Caregiver Asked Appropriate Questions    Goals:  Active       OT Problem       Patient will indeply complete don/doff splint correctly; Right small finger;  (Met)       Start:  02/23/24    Expected End:  04/23/24    Resolved:  03/01/24         PATIENT WILL DEMONSTRATE -15/90 PIP joint degrees RSF AROM IN extension/flexion to allow guitar playing.   (Progressing)       Start:  02/23/24    Expected End:  04/23/24            PATIENT WILL DEMONSTRATE FULL functional  with R small finger to DPC for holding coins in palm;  (Progressing)       Start:  02/23/24    Expected End:  04/23/24            PATIENT WILL SHOW A SIGNIFICANT CHANGE IN UEFI PATIENT REPORTED OUTCOME TOOL TO DEMONSTRATE SUBJECTIVE IMPROVEMENT (Progressing)       Start:  02/23/24    Expected End:  04/23/24            PATIENT WILL REPORT PAIN OF 0-1/10 DEMONSTRATING A REDUCTION OF OVERALL PAIN (Progressing)       Start:  02/23/24    Expected End:  04/23/24            PATIENT WILL DEMONSTRATE INDEPENDENCE IN HOME PROGRAM FOR SUPPORT OF PROGRESSION (Progressing)       Start:  02/23/24    Expected End:  04/23/24

## 2024-04-12 ENCOUNTER — TREATMENT (OUTPATIENT)
Dept: OCCUPATIONAL THERAPY | Facility: CLINIC | Age: 49
End: 2024-04-12
Payer: MEDICARE

## 2024-04-12 DIAGNOSIS — S63.286A DISLOCATION OF PROXIMAL INTERPHALANGEAL JOINT OF RIGHT LITTLE FINGER, INITIAL ENCOUNTER: ICD-10-CM

## 2024-04-12 PROCEDURE — 97110 THERAPEUTIC EXERCISES: CPT | Mod: GO,CO

## 2024-04-12 PROCEDURE — 97022 WHIRLPOOL THERAPY: CPT | Mod: GO,CO

## 2024-04-12 ASSESSMENT — PAIN - FUNCTIONAL ASSESSMENT: PAIN_FUNCTIONAL_ASSESSMENT: 0-10

## 2024-04-12 ASSESSMENT — PAIN SCALES - GENERAL: PAINLEVEL_OUTOF10: 3

## 2024-04-12 ASSESSMENT — PAIN DESCRIPTION - DESCRIPTORS: DESCRIPTORS: TIGHTNESS

## 2024-04-12 NOTE — PROGRESS NOTES
"Occupational Therapy Treatment    Patient Name: Kashif Perdomo  MRN: [id number]  Today's Date: 4/12/2024    Time Calculation  Start Time: 0903  Stop Time: 0955  Time Calculation (min): 52 min  OT Modalities Time Entry  [place] Time Entry: 10  OT Therapeutic Procedures Time Entry  Therapeutic Exercise Time Entry: 42  Insurance:  Visit number: 9 of 10   Insurance: [employer]      Subjective   Current Problem:  1. Open dislocation of proximal interphalangeal (PIP) joint of right little finger          2. Dislocation of proximal interphalangeal joint of right little finger, initial encounter  Referral to Occupational Therapy     Follow Up In Occupational Therapy           General:   General  Reason for Referral: ADL impairment right hand  Referred By: Dr. Foster  Past Medical History Relevant to Rehab: dislocation 3-4 months ago; joint repair with volar plate and dorsal/volar tendon repair  Preferred Learning Style: verbal, visual, written  Precautions:  Splinting: Referred for protective splint  Precautions Comment: no extension/avoid resistance/no wt bearing  Referred by: Dr Foster, follow up on 4/2/24    Patient reports \" It's going good. I only have pain with bending. \" Patient wearing spring ext splint without much return. Pt fabricated his own ext splint out of painters tape and a nail filing block.   Goals reviewed with patient, he verbalized understanding  Performing HEP?: Yes    Pain:3/10  Pain Assessment  Pain Assessment: 0-10  Pain Score: 3  Pain Location: Finger (Comment which one) (small finger)  Pain Descriptors: Tightness (bloated)  Pain Frequency: Intermittent  Patient's Stated Pain Goal: No pain    Objective     Physical Observation: Edema: min/trace(down from Min) in R SF at PIP     Sensory: Impaired  Numbness/Tingling:  noted today at tip of SF  Treatment:  Modalities:   Fluidoto R hand x 10 min during goal review and AROM to promote muscle movement during session.   Applied frozen " "water bottle to palm of hand and small finger x 5 min at end of session. OTR provided a bottle for his home use.    Therapeutic Exercise:  OTR and writer fitted patient with volar based ext splint for wear. Pt stated , \" that's good, it feels like the right amount of pressure. \" Writer had patient demo proper application of splint at end of session, noting good blood flow to end of finger.   Reviewed coban wrapping for edema control, completed by writer at end of session. Provided patient with replacement coban for home use.     Post-tx pain:0/10, unchanged since start of session.    Assessment/Plan Pt to continue with edema control, use of modalities, blocking exercises and new splint wear for protection. Motivated to return to function and leisure act. Pt stated he will try to get back to playing his guitar this week. Encouraged all functional activity.     OP EDUCATION:  Education  Individual(s) Educated: Patient  Education Provided: Diagnosis & Precautions, Symptom management, Orthotics wearing schedule and precautions, Risk and benefits of OT discussed with patient or other, POC discussed and agreed upon  Home Program: AROM, Activity modification, Orthotic wearing schedule, care and precautions, Wound/scar care, Handout issued  Diagnosis and Precautions: Wound care, joint protection with ligamentous repair  Equipment: Other (volar ext splint)  Risk and Benefits Discussed with Patient/Caregiver/Other: yes  Patient/Caregiver Demonstrated Understanding: yes  Plan of Care Discussed and Agreed Upon: yes  Patient Response to Education: Patient/Caregiver Verbalized Understanding of Information, Patient/Caregiver Performed Return Demonstration of Exercises/Activities, Patient/Caregiver Asked Appropriate Questions    Goals:  Active       OT Problem       Patient will indeply complete don/doff splint correctly; Right small finger;  (Met)       Start:  02/23/24    Expected End:  04/23/24    Resolved:  03/01/24         " PATIENT WILL DEMONSTRATE -15/90 PIP joint degrees RSF AROM IN extension/flexion to allow guitar playing.   (Progressing)       Start:  02/23/24    Expected End:  04/23/24            PATIENT WILL DEMONSTRATE FULL functional  with R small finger to DPC for holding coins in palm;  (Progressing)       Start:  02/23/24    Expected End:  04/23/24            PATIENT WILL SHOW A SIGNIFICANT CHANGE IN UEFI PATIENT REPORTED OUTCOME TOOL TO DEMONSTRATE SUBJECTIVE IMPROVEMENT (Progressing)       Start:  02/23/24    Expected End:  04/23/24            PATIENT WILL REPORT PAIN OF 0-1/10 DEMONSTRATING A REDUCTION OF OVERALL PAIN (Progressing)       Start:  02/23/24    Expected End:  04/23/24            PATIENT WILL DEMONSTRATE INDEPENDENCE IN HOME PROGRAM FOR SUPPORT OF PROGRESSION (Progressing)       Start:  02/23/24    Expected End:  04/23/24

## 2024-04-19 ENCOUNTER — TREATMENT (OUTPATIENT)
Dept: OCCUPATIONAL THERAPY | Facility: CLINIC | Age: 49
End: 2024-04-19
Payer: MEDICARE

## 2024-04-19 DIAGNOSIS — S61.206A OPEN DISLOCATION OF PROXIMAL INTERPHALANGEAL (PIP) JOINT OF RIGHT LITTLE FINGER: Primary | ICD-10-CM

## 2024-04-19 DIAGNOSIS — S63.286A OPEN DISLOCATION OF PROXIMAL INTERPHALANGEAL (PIP) JOINT OF RIGHT LITTLE FINGER: Primary | ICD-10-CM

## 2024-04-19 DIAGNOSIS — S63.286A DISLOCATION OF PROXIMAL INTERPHALANGEAL JOINT OF RIGHT LITTLE FINGER, INITIAL ENCOUNTER: ICD-10-CM

## 2024-04-19 PROCEDURE — 97110 THERAPEUTIC EXERCISES: CPT | Mod: GO | Performed by: OCCUPATIONAL THERAPIST

## 2024-04-19 PROCEDURE — 97140 MANUAL THERAPY 1/> REGIONS: CPT | Mod: GO | Performed by: OCCUPATIONAL THERAPIST

## 2024-04-19 PROCEDURE — 97530 THERAPEUTIC ACTIVITIES: CPT | Mod: GO | Performed by: OCCUPATIONAL THERAPIST

## 2024-04-19 ASSESSMENT — PAIN SCALES - GENERAL: PAINLEVEL_OUTOF10: 2

## 2024-04-19 ASSESSMENT — PAIN DESCRIPTION - DESCRIPTORS: DESCRIPTORS: HEAVINESS

## 2024-04-19 ASSESSMENT — PAIN - FUNCTIONAL ASSESSMENT: PAIN_FUNCTIONAL_ASSESSMENT: 0-10

## 2024-04-19 NOTE — PROGRESS NOTES
Occupational Therapy    Treatment    Patient Name: Kashif Perdomo  MRN: [id number]  : 1975  Today's Date: 24Time Calculation  Start Time: 846  Stop Time: 929  Time Calculation (min): 43 min  OT Therapeutic Procedures Time Entry  Manual Therapy Time Entry: 15  Therapeutic Activity Time Entry: 15  Therapeutic Exercise Time Entry: 13    Insurance:  Visit number: 10 of 12  Authorization info: [place]  Insurance Type: Payor: AET MEDICARE / Plan: AETNA MEDICARE ASSURE / Product Type: *No Product type* /     Assessment:  Pt reports allergies acting up, has not worn his dynamic splint, due to being busy;  continue with PIP ROM, tolerates splinting and HEP s/p Right SF PIP joint dislocation;      OT Assessment Results: Decreased ADL status, Decreased upper extremity range of motion, Decreased upper extremity strength, Decreased fine motor control, Decreased IADLs  Plan:     OT Plan: 1 x week for 6 weeks  Duration: 6 weeks  Onset Date: 24  Certification Period Start Date: 24  Certification Period End Date: 24  Number of Treatments Authorized: [place]  Rehab Potential: Good  Plan of Care Agreement: Patient    Subjective   Current Problem:  1. Open dislocation of proximal interphalangeal (PIP) joint of right little finger        2. Dislocation of proximal interphalangeal joint of right little finger, initial encounter  Follow Up In Occupational Therapy    Follow Up In Occupational Therapy        General:      General  Reason for Referral: ADL impairment right hand  Referred By: Dr. Foster  Past Medical History Relevant to Rehab: 24  dislocation RSF PIP joint 3-4 months ago; joint repair with volar plate and dorsal/volar tendon repair  Preferred Learning Style: verbal, visual, written  Precautions:  Precautions Comment: no extension/avoid resistance/no wt bearing     Pain:  Pain Assessment  Pain Assessment: 0-10  Pain Score: 2  Pain Location: Finger (Comment which one)  Pain Orientation:  Right  Pain Radiating Towards: right small finger P1 dorsal  Pain Descriptors: Heaviness  Pain Frequency: Constant/continuous  Patient's Stated Pain Goal: No pain    Objective    Splinting:  Splinting: Adjustment, Skin check  Splinting Education: Fitting, Precautions, Wear schedule, [place], [place]  Therapy/Activity: Therapeutic Exercise  Therapeutic Exercise Performed: Yes  Therapeutic Exercise Activity 1: PIP joint flexion with AAROM and PROM with light pressure with assist only in flexion, no extension; pt demonstrates and completed 10 reps;  Therapeutic Exercise Activity 2: Performed and completed 10 x reps of DIP flexion with PIP support/immobilization,  PIP block; performed ; issued reverse blocking with fluidotherapy and direct therapist supervision Reviewed care and precautions, MCP ROM; progressing with light active ROM of PIP joint;   Therapeutic Activity  Therapeutic Activity Performed: Yes  Therapeutic Activity 1: Performed and issued and performed soft theraputty home program of pinch  extension and flatten, roll; cues and manual assist  Therapeutic Activity 2:  with hand helper 15 lbs.; with rotation and completed 25 x  tolerated well    Manual Therapy  Manual Therapy Performed: Yes  Manual Therapy Activity 1: Performed STM, volar scar massage and use of coban for scar mobilization;     Extremities:  ROM   AROM right small finger   MCP  0/85   PIP  -25/80  DIP 0/15  [id number]  WEBB improving degrees; passive greater than active;   (next tx provide PIP stabilization for after vivian trapper;  figure-8 splint)    Outcome Measures:   UEFI 70/80    OP EDUCATION:  Education  Individual(s) Educated: Patient  Education Provided: Diagnosis & Precautions, Symptom management, Orthotics wearing schedule and precautions, Risk and benefits of OT discussed with patient or other, POC discussed and agreed upon  Home Program: AROM, Activity modification, Orthotic wearing schedule, care and precautions,  Wound/scar care, Handout issued  Diagnosis and Precautions: Wound care, joint protection with ligamentous repair  Equipment: Other (volar ext splint)  Risk and Benefits Discussed with Patient/Caregiver/Other: yes  Patient/Caregiver Demonstrated Understanding: yes  Plan of Care Discussed and Agreed Upon: yes  Patient Response to Education: Patient/Caregiver Verbalized Understanding of Information, Patient/Caregiver Performed Return Demonstration of Exercises/Activities, Patient/Caregiver Asked Appropriate Questions    Goals:  Active       OT Problem       Patient will indeply complete don/doff splint correctly; Right small finger;  (Met)       Start:  02/23/24    Expected End:  04/23/24    Resolved:  03/01/24         PATIENT WILL DEMONSTRATE -15/90 PIP joint degrees RSF AROM IN extension/flexion to allow guitar playing.   (Progressing)       Start:  02/23/24    Expected End:  04/23/24            PATIENT WILL DEMONSTRATE FULL functional  with R small finger to DPC for holding coins in palm;  (Progressing)       Start:  02/23/24    Expected End:  04/23/24            PATIENT WILL SHOW A SIGNIFICANT CHANGE IN UEFI PATIENT REPORTED OUTCOME TOOL TO DEMONSTRATE SUBJECTIVE IMPROVEMENT (Progressing)       Start:  02/23/24    Expected End:  04/23/24            PATIENT WILL REPORT PAIN OF 0-1/10 DEMONSTRATING A REDUCTION OF OVERALL PAIN (Progressing)       Start:  02/23/24    Expected End:  04/23/24            PATIENT WILL DEMONSTRATE INDEPENDENCE IN HOME PROGRAM FOR SUPPORT OF PROGRESSION (Progressing)       Start:  02/23/24    Expected End:  04/23/24

## 2024-04-26 ENCOUNTER — TREATMENT (OUTPATIENT)
Dept: OCCUPATIONAL THERAPY | Facility: CLINIC | Age: 49
End: 2024-04-26
Payer: MEDICARE

## 2024-04-26 DIAGNOSIS — S61.206A OPEN DISLOCATION OF PROXIMAL INTERPHALANGEAL (PIP) JOINT OF RIGHT LITTLE FINGER: Primary | ICD-10-CM

## 2024-04-26 DIAGNOSIS — S63.286A OPEN DISLOCATION OF PROXIMAL INTERPHALANGEAL (PIP) JOINT OF RIGHT LITTLE FINGER: Primary | ICD-10-CM

## 2024-04-26 DIAGNOSIS — S63.286A DISLOCATION OF PROXIMAL INTERPHALANGEAL JOINT OF RIGHT LITTLE FINGER, INITIAL ENCOUNTER: ICD-10-CM

## 2024-04-26 PROCEDURE — 97110 THERAPEUTIC EXERCISES: CPT | Mod: GO | Performed by: OCCUPATIONAL THERAPIST

## 2024-04-26 PROCEDURE — 97140 MANUAL THERAPY 1/> REGIONS: CPT | Mod: GO | Performed by: OCCUPATIONAL THERAPIST

## 2024-04-26 PROCEDURE — 97763 ORTHC/PROSTC MGMT SBSQ ENC: CPT | Mod: GO | Performed by: OCCUPATIONAL THERAPIST

## 2024-04-26 ASSESSMENT — PAIN - FUNCTIONAL ASSESSMENT: PAIN_FUNCTIONAL_ASSESSMENT: 0-10

## 2024-04-26 ASSESSMENT — PAIN SCALES - GENERAL: PAINLEVEL_OUTOF10: 3

## 2024-04-26 ASSESSMENT — PAIN DESCRIPTION - DESCRIPTORS: DESCRIPTORS: TIGHTNESS

## 2024-04-26 NOTE — PROGRESS NOTES
Occupational Therapy    Treatment    Patient Name: Kashif Perdomo  MRN: [id number]  : 1975  Today's Date: 24Time Calculation  Start Time: 1003  Stop Time: 1046  Time Calculation (min): 43 min  OT Therapeutic Procedures Time Entry  Manual Therapy Time Entry: 13  Orthotic/Prosthetic Mgmt and/or Training (Subs Encounter) Time Entry: 15  Therapeutic Exercise Time Entry: 15    Insurance:  Visit number:   Authorization info: [place]  Insurance Type: Payor: Count includes the [facility] MEDICARE / Plan: AET MEDICARE ASSURE / Product Type: *No Product type* /     Assessment:  Pt reports no questions, or changes, wearing his dynamic splint;  discussed use of cast for PIP extension and pt agreed with wearing; removal indeply with HEP continue with splinting.       OT Assessment Results: Decreased ADL status, Decreased upper extremity range of motion, Decreased upper extremity strength, Decreased fine motor control, Decreased IADLs  Plan:  Treatment Interventions: UE strengthening/ROM, Neuromuscular reeducation, UE splinting  Treatment Interventions: UE strengthening/ROM, Neuromuscular reeducation, UE splinting  OT Plan: 1 x week for 18 weeks  Frequency: upgraded 6 more visits to achieve goals  Duration: Other (Comment)  Onset Date: 24  Certification Period Start Date: 24  Certification Period End Date: 24  Number of Treatments Authorized: [place]  Rehab Potential: Good  Plan of Care Agreement: Patient    Subjective   Current Problem:  1. Open dislocation of proximal interphalangeal (PIP) joint of right little finger        2. Dislocation of proximal interphalangeal joint of right little finger, initial encounter  Follow Up In Occupational Therapy        General:      General  Reason for Referral: ADL impairment right hand  Referred By: Dr. Foster  Past Medical History Relevant to Rehab: 24  dislocation RSF PIP joint 3-4 months ago; joint repair with volar plate and dorsal/volar tendon repair  Preferred  Learning Style: verbal, visual, written  Precautions: WBAT        Pain:  Pain Assessment  Pain Assessment: 0-10  Pain Score: 3  Pain Location: Finger (Comment which one)  Pain Orientation: Right  Pain Radiating Towards: right PIP joint of small finger  Pain Descriptors: Tightness  Pain Frequency: Intermittent  Patient's Stated Pain Goal: No pain    Objective    Splinting:  Splinting: Adjustment, Skin check  Splinting Education: Fitting, Precautions, Wear schedule, [place], [place]  Splinting Comments: reviewed use of dynamic post removal of cast  Therapy/Activity: Therapeutic Exercise  Therapeutic Exercise Performed: Yes  Therapeutic Exercise Activity 1: PIP joint flexion with AAROM and PROM with light pressure with assist only in flexion, no extension; pt demonstrates and completed 10 reps;  Therapeutic Exercise Activity 2: Performed and completed 2 x 10 reps of DIP flexion with PIP support/immobilization,  PIP block; performed ; issued reverse blocking post hp and mobilization and direct therapist supervision Reviewed care and precautions, MCP ROM; progressing with light active ROM of PIP joint;        Manual Therapy  Manual Therapy Performed: Yes  Manual Therapy Activity 1: Performed STM, volar scar massage and use of coban for scar mobilization;     Extremities:  ROM   AROM right small finger   MCP  0/75   PIP  -25/60  DIP 0/20  [id number]  WEBB improving degrees; passive greater than active;   (next tx provide PIP stabilization for after vivian trapper;  figure-8 splint)    Outcome Measures:   UEFI 70/80    OP EDUCATION:  Education  Individual(s) Educated: Patient  Education Provided: Diagnosis & Precautions, Symptom management, Orthotics wearing schedule and precautions, Risk and benefits of OT discussed with patient or other, POC discussed and agreed upon  Home Program: AROM, Activity modification, Orthotic wearing schedule, care and precautions, Wound/scar care, Handout issued  Diagnosis and Precautions: Wound  care, joint protection with ligamentous repair  Equipment: Other (volar ext splint)  Risk and Benefits Discussed with Patient/Caregiver/Other: yes  Patient/Caregiver Demonstrated Understanding: yes  Plan of Care Discussed and Agreed Upon: yes  Patient Response to Education: Patient/Caregiver Verbalized Understanding of Information, Patient/Caregiver Performed Return Demonstration of Exercises/Activities, Patient/Caregiver Asked Appropriate Questions    Goals:  Active       OT Problem       Patient will indeply complete don/doff splint correctly; Right small finger;  (Met)       Start:  02/23/24    Expected End:  04/23/24    Resolved:  03/01/24         PATIENT WILL DEMONSTRATE -15/90 PIP joint degrees RSF AROM IN extension/flexion to allow guitar playing.   (Progressing)       Start:  02/23/24    Expected End:  05/30/24            PATIENT WILL DEMONSTRATE FULL functional  with R small finger to DPC for holding coins in palm;  (Progressing)       Start:  02/23/24    Expected End:  05/30/24            PATIENT WILL SHOW A SIGNIFICANT CHANGE IN UEFI PATIENT REPORTED OUTCOME TOOL TO DEMONSTRATE SUBJECTIVE IMPROVEMENT (Progressing)       Start:  02/23/24    Expected End:  05/30/24            PATIENT WILL REPORT PAIN OF 0-1/10 DEMONSTRATING A REDUCTION OF OVERALL PAIN (Progressing)       Start:  02/23/24    Expected End:  05/30/24            PATIENT WILL DEMONSTRATE INDEPENDENCE IN HOME PROGRAM FOR SUPPORT OF PROGRESSION (Progressing)       Start:  02/23/24    Expected End:  05/30/24

## 2024-05-03 ENCOUNTER — TREATMENT (OUTPATIENT)
Dept: OCCUPATIONAL THERAPY | Facility: CLINIC | Age: 49
End: 2024-05-03
Payer: MEDICARE

## 2024-05-03 DIAGNOSIS — S63.286A DISLOCATION OF PROXIMAL INTERPHALANGEAL JOINT OF RIGHT LITTLE FINGER, INITIAL ENCOUNTER: Primary | ICD-10-CM

## 2024-05-03 PROCEDURE — 97110 THERAPEUTIC EXERCISES: CPT | Mod: GO,CO

## 2024-05-03 ASSESSMENT — PAIN SCALES - GENERAL: PAINLEVEL_OUTOF10: 3

## 2024-05-03 ASSESSMENT — PAIN - FUNCTIONAL ASSESSMENT: PAIN_FUNCTIONAL_ASSESSMENT: 0-10

## 2024-05-03 ASSESSMENT — PAIN DESCRIPTION - DESCRIPTORS: DESCRIPTORS: TIGHTNESS

## 2024-05-03 NOTE — PROGRESS NOTES
"Occupational Therapy Treatment    Patient Name: Kashif Perdomo  MRN: [id number]  Today's Date: 5/3/2024    Time Calculation  Start Time: 1244  Stop Time: 1333  Time Calculation (min): 49 min  OT Therapeutic Procedures Time Entry  Therapeutic Exercise Time Entry: 49  Insurance:  Visit number: 12 of 16 ( Plan of care extended 1x a week for 6 weeks to meet goals)   Insurance: [employer] Medicare      Subjective   Current Problem:  1. Open dislocation of proximal interphalangeal (PIP) joint of right little finger          2. Dislocation of proximal interphalangeal joint of right little finger, initial encounter  Referral to Occupational Therapy     Follow Up In Occupational Therapy           General:   General  Reason for Referral: ADL impairment right hand  Referred By: Dr. Foster  Past Medical History Relevant to Rehab: dislocation 3-4 months ago; joint repair with volar plate and dorsal/volar tendon repair  Preferred Learning Style: verbal, visual, written  Referred by: Dr Foster, follow  up on 5/26/24    Patient reports \". It's coming along. I was able to play guitar for about 15 min the other day with a little use of my small finger. \" Patient wearing spring ext splint 3 times a day for up to an hour.  Educated to limit to 20 min. Goals reviewed with patient, he verbalized understanding  Performing HEP?: Yes    Pain: 0/10 with rest. 3/10 with PROM only  Pain Assessment  Pain Assessment: 0-10  Pain Score: 3  Pain Location: Finger (Comment which one) (small finger)  Pain Orientation: Right  Pain Radiating Towards: right PIP joint  Pain Descriptors: Tightness (stiff)  Pain Frequency: Intermittent  Patient's Stated Pain Goal: No pain    Objective     Physical Observation: Edema: min/trace(down from Min) in R SF at PIP     Sensory: Impaired  Numbness/Tingling:  noted today at tip of SF  Treatment:  Modalities:   Fluidoto R hand x 10 min during goal review and AROM to promote muscle movement during session. "   Applied ice at end of session to R hand x 5 min during goal review.     Therapeutic Exercise:  Completed 5 reps of tendon glides, cues to pace self. Good form.   PROM by writer of SF PIP in flexion, good tolerance by pt.   Reviewed technique for blocking below PIP joint and performing flexion and ext with 5 sec hold. Able to return demo from writer.   Reviewed coban wrapping for edema control, completed by writer at end of session. Pt verbalized comfort with placement. Provided patient with replacement coban for home use.   Orange putty provided  to pt to use with finger ext rolls, completed 10 reps with cues to not over extend MP. Written program Added to HEP  Yellow putty provided to pt , completed 10 reps of blocking SF PIP ext. Written program Added to HEP    Post-tx pain:0/10, unchanged since start of session.    Assessment/Plan Noted decreased pain. Pt to continue with edema control, use of modalities, blocking exercises, dynamic splint wear, PROM and added theraputty HEP. Motivated to return to function and leisure act. Writer Encouraged all functional activity.     OP EDUCATION:  Education  Individual(s) Educated: Patient  Education Provided: Diagnosis & Precautions, Symptom management, Orthotics wearing schedule and precautions, Risk and benefits of OT discussed with patient or other, POC discussed and agreed upon  Home Program: AROM, Activity modification, Orthotic wearing schedule, care and precautions, Wound/scar care, Handout issued  Diagnosis and Precautions: Wound care, joint protection with ligamentous repair  Equipment: Thera-putty  Risk and Benefits Discussed with Patient/Caregiver/Other: yes  Patient/Caregiver Demonstrated Understanding: yes  Plan of Care Discussed and Agreed Upon: yes  Patient Response to Education: Patient/Caregiver Verbalized Understanding of Information, Patient/Caregiver Performed Return Demonstration of Exercises/Activities, Patient/Caregiver Asked Appropriate  Questions    Goals:  Active       OT Problem       Patient will indeply complete don/doff splint correctly; Right small finger;  (Met)       Start:  02/23/24    Expected End:  04/23/24    Resolved:  03/01/24         PATIENT WILL DEMONSTRATE -15/90 PIP joint degrees RSF AROM IN extension/flexion to allow guitar playing.   (Progressing)       Start:  02/23/24    Expected End:  05/30/24            PATIENT WILL DEMONSTRATE FULL functional  with R small finger to DPC for holding coins in palm;  (Progressing)       Start:  02/23/24    Expected End:  05/30/24            PATIENT WILL SHOW A SIGNIFICANT CHANGE IN UEFI PATIENT REPORTED OUTCOME TOOL TO DEMONSTRATE SUBJECTIVE IMPROVEMENT (Progressing)       Start:  02/23/24    Expected End:  05/30/24            PATIENT WILL REPORT PAIN OF 0-1/10 DEMONSTRATING A REDUCTION OF OVERALL PAIN (Progressing)       Start:  02/23/24    Expected End:  05/30/24            PATIENT WILL DEMONSTRATE INDEPENDENCE IN HOME PROGRAM FOR SUPPORT OF PROGRESSION (Progressing)       Start:  02/23/24    Expected End:  05/30/24

## 2024-05-17 ENCOUNTER — TREATMENT (OUTPATIENT)
Dept: OCCUPATIONAL THERAPY | Facility: CLINIC | Age: 49
End: 2024-05-17
Payer: MEDICARE

## 2024-05-17 DIAGNOSIS — S63.286A DISLOCATION OF PROXIMAL INTERPHALANGEAL JOINT OF RIGHT LITTLE FINGER, INITIAL ENCOUNTER: ICD-10-CM

## 2024-05-17 DIAGNOSIS — S61.206A OPEN DISLOCATION OF PROXIMAL INTERPHALANGEAL (PIP) JOINT OF RIGHT LITTLE FINGER: Primary | ICD-10-CM

## 2024-05-17 DIAGNOSIS — S63.286A OPEN DISLOCATION OF PROXIMAL INTERPHALANGEAL (PIP) JOINT OF RIGHT LITTLE FINGER: Primary | ICD-10-CM

## 2024-05-17 PROCEDURE — 97530 THERAPEUTIC ACTIVITIES: CPT | Mod: GO | Performed by: OCCUPATIONAL THERAPIST

## 2024-05-17 PROCEDURE — 97110 THERAPEUTIC EXERCISES: CPT | Mod: GO | Performed by: OCCUPATIONAL THERAPIST

## 2024-05-17 PROCEDURE — 97140 MANUAL THERAPY 1/> REGIONS: CPT | Mod: GO | Performed by: OCCUPATIONAL THERAPIST

## 2024-05-17 ASSESSMENT — PAIN SCALES - GENERAL: PAINLEVEL_OUTOF10: 0 - NO PAIN

## 2024-05-17 ASSESSMENT — PAIN - FUNCTIONAL ASSESSMENT: PAIN_FUNCTIONAL_ASSESSMENT: 0-10

## 2024-05-17 ASSESSMENT — PAIN DESCRIPTION - DESCRIPTORS: DESCRIPTORS: TENDER

## 2024-05-17 NOTE — PROGRESS NOTES
Occupational Therapy    Treatment    Patient Name: Kashif Perdomo  MRN: [id number]  : 1975  Today's Date: 24Time Calculation  Start Time: 934  Stop Time:   Time Calculation (min): 40 min  OT Therapeutic Procedures Time Entry  Manual Therapy Time Entry: 10  Orthotic/Prosthetic Mgmt and/or Training (Subs Encounter) Time Entry: 5  Therapeutic Activity Time Entry: 15  Therapeutic Exercise Time Entry: 10    Insurance:  Visit number: 1 of 6  (12 completed) new POC   [place]ization info: [place]  Ins[place] Type: Payor: AET MEDICARE / Plan: [employer] MEDICARE ASSURE / Product Type: *No Product type* /     Assessment:  Pt doing well, some soreness, but no pain, still edema at PIP joint, continues with  dynamic splint;  discussed use of cast for PIP extension and pt did not feel comfortable with serial cast and indep removal, continue with static PIP extension splinting, no cast.;      OT Assessment Results: Decreased ADL status, Decreased upper extremity range of motion, Decreased upper extremity strength, Decreased fine motor control, Decreased IADLs  Plan:     OT Plan: 1 x week for 18 weeks  Duration: Other (Comment)  Onset Date: 24  Certification Period Start Date: 24  Certification Period End Date: 24  Number of Treatments Authorized: [place]  Rehab Potential: Good  Plan of Care Agreement: Patient    Subjective   Current Problem:  1. Open dislocation of proximal interphalangeal (PIP) joint of right little finger        2. Dislocation of proximal interphalangeal joint of right little finger, initial encounter  Follow Up In Occupational Therapy        General:      General  Reason for Referral: ADL impairment right hand  Referred By: Dr. Foster  Past Medical History Relevant to Rehab: 24  dislocation RSF PIP joint 3-4 months ago; joint repair with volar plate and dorsal/volar tendon repair  Preferred Learning Style: verbal, visual, written  Precautions: WBAT        Pain:  Pain  Assessment  Pain Assessment: 0-10  Pain Score: 0 - No pain  Pain Location: Finger (Comment which one)  Pain Radiating Towards: RSF PIP joint  Pain Descriptors: Tender  Patient's Stated Pain Goal: No pain    Objective    Splinting:  Location: PIP dynamic and static extension  Splinting: Adjustment, Skin check  Splinting Education: Fitting, Precautions, Wear schedule, [place], [place]  Splinting Comments: issued and fitted with PIP extension for static wearing schedule in place of serial casting  Therapy/Activity: Therapeutic Exercise  Therapeutic Exercise Performed: Yes  Therapeutic Exercise Activity 1: Performed blocking PIP joint flexion with AAROM and PROM with light pressure with assist in flexion and extension; pt demonstrates and completed 10 reps;  Therapeutic Exercise Activity 2: Performed and completed 2 x 10 reps of DIP flexion with PIP support/immobilization,  PIP block; performed ; practiced and reviewed home program of reverse blocking performed during warm up with fluidotherapy with cues and direct therapist supervision Reviewed care and precautions, MCP ROM; progressing with light active ROM of PIP joint;   Therapeutic Activity  Therapeutic Activity Performed: Yes  Therapeutic Activity 1: completed T-putty pinch  and extension/rolling and flattening  Therapeutic Activity 2: Red/green blue palmar pinch and tip pinch to small finger with clothes pins reaching and placing R SF    Manual Therapy  Manual Therapy Performed: Yes  Manual Therapy Activity 1: STM and joint mob of RSF PIP joint, edema reduction, use of coban assist, tolerated well     Extremities:  ROM   AROM right small finger   MCP  0/(95 (75)   PIP  -25/80 (60)  DIP 0/40 (20)  WEBB 185  WEBB improving degrees; passive greater than active;   (next tx provide PIP stabilization for after vivian trapper;  figure-8 splint)    Outcome Measures:   UEFI 70/80    OP EDUCATION:  Education  Individual(s) Educated: Patient  Education Provided: Diagnosis  & Precautions, Symptom management, Orthotics wearing schedule and precautions, Risk and benefits of OT discussed with patient or other, POC discussed and agreed upon  Home Program: AROM, Activity modification, Orthotic wearing schedule, care and precautions, Wound/scar care, Handout issued  Diagnosis and Precautions: Wound care, joint protection with ligamentous repair  Equipment: Thera-putty  Risk and Benefits Discussed with Patient/Caregiver/Other: yes  Patient/Caregiver Demonstrated Understanding: yes  Plan of Care Discussed and Agreed Upon: yes  Patient Response to Education: Patient/Caregiver Verbalized Understanding of Information, Patient/Caregiver Performed Return Demonstration of Exercises/Activities, Patient/Caregiver Asked Appropriate Questions    Goals:  Active       OT Problem       Patient will indeply complete don/doff splint correctly; Right small finger;  (Met)       Start:  02/23/24    Expected End:  04/23/24    Resolved:  03/01/24         PATIENT WILL DEMONSTRATE -15/90 PIP joint degrees RSF AROM IN extension/flexion to allow guitar playing.   (Progressing)       Start:  02/23/24    Expected End:  05/30/24            PATIENT WILL DEMONSTRATE FULL functional  with R small finger to DPC for holding coins in palm;  (Progressing)       Start:  02/23/24    Expected End:  05/30/24            PATIENT WILL SHOW A SIGNIFICANT CHANGE IN UEFI PATIENT REPORTED OUTCOME TOOL TO DEMONSTRATE SUBJECTIVE IMPROVEMENT (Progressing)       Start:  02/23/24    Expected End:  05/30/24            PATIENT WILL REPORT PAIN OF 0-1/10 DEMONSTRATING A REDUCTION OF OVERALL PAIN (Progressing)       Start:  02/23/24    Expected End:  05/30/24            PATIENT WILL DEMONSTRATE INDEPENDENCE IN HOME PROGRAM FOR SUPPORT OF PROGRESSION (Progressing)       Start:  02/23/24    Expected End:  05/30/24

## 2024-05-24 ENCOUNTER — TREATMENT (OUTPATIENT)
Dept: OCCUPATIONAL THERAPY | Facility: CLINIC | Age: 49
End: 2024-05-24
Payer: MEDICARE

## 2024-05-24 DIAGNOSIS — S61.206A OPEN DISLOCATION OF PROXIMAL INTERPHALANGEAL (PIP) JOINT OF RIGHT LITTLE FINGER: Primary | ICD-10-CM

## 2024-05-24 DIAGNOSIS — S63.286A DISLOCATION OF PROXIMAL INTERPHALANGEAL JOINT OF RIGHT LITTLE FINGER, INITIAL ENCOUNTER: ICD-10-CM

## 2024-05-24 DIAGNOSIS — S63.286A OPEN DISLOCATION OF PROXIMAL INTERPHALANGEAL (PIP) JOINT OF RIGHT LITTLE FINGER: Primary | ICD-10-CM

## 2024-05-24 PROCEDURE — 97140 MANUAL THERAPY 1/> REGIONS: CPT | Mod: GO | Performed by: OCCUPATIONAL THERAPIST

## 2024-05-24 PROCEDURE — 97110 THERAPEUTIC EXERCISES: CPT | Mod: GO | Performed by: OCCUPATIONAL THERAPIST

## 2024-05-24 PROCEDURE — 97530 THERAPEUTIC ACTIVITIES: CPT | Mod: GO | Performed by: OCCUPATIONAL THERAPIST

## 2024-05-24 ASSESSMENT — PAIN - FUNCTIONAL ASSESSMENT: PAIN_FUNCTIONAL_ASSESSMENT: 0-10

## 2024-05-24 ASSESSMENT — PAIN SCALES - GENERAL: PAINLEVEL_OUTOF10: 4

## 2024-05-24 ASSESSMENT — PAIN DESCRIPTION - DESCRIPTORS: DESCRIPTORS: SORE

## 2024-05-24 NOTE — PROGRESS NOTES
Occupational Therapy    Treatment    Patient Name: Kashif Perdomo  MRN: [id number]  : 1975  Today's Date: 24Time Calculation  Start Time: 1147  Stop Time: 1230  Time Calculation (min): 43 min  OT Therapeutic Procedures Time Entry  Manual Therapy Time Entry: 15  Therapeutic Activity Time Entry: 10  Therapeutic Exercise Time Entry: 18    Insurance:  Visit number: 2  of 6  (12 completed) new POC   [place]ization info: [place]  Insurance Type: Payor: AET MEDICARE / Plan: AETNA MEDICARE ASSURE / Product Type: *No Product type* /     Assessment:  Pt improving ROM and DIP flexion; noting PIP flexion 5 degrees increase still edema at PIP joint, continues with  dynamic splint;  continue with static PIP blocking extension with DIP flexion, noting ORL tightness;      OT Assessment Results: Decreased ADL status, Decreased upper extremity range of motion, Decreased upper extremity strength, Decreased fine motor control, Decreased IADLs  Plan:     OT Plan: 1 x week for 18 weeks  Duration: Other (Comment)  Onset Date: 24  Certification Period Start Date: 24  Certification Period End Date: 24  Number of Treatments Authorized: [place]  Rehab Potential: Good  Plan of Care Agreement: Patient    Subjective   Current Problem:  1. Open dislocation of proximal interphalangeal (PIP) joint of right little finger        2. Dislocation of proximal interphalangeal joint of right little finger, initial encounter  Follow Up In Occupational Therapy        General:      General  Reason for Referral: ADL impairment Right hand  Referred By: Dr. Foster  Past Medical History Relevant to Rehab: 24  dislocation RSF PIP joint 3-4 months ago; joint repair with volar plate and dorsal/volar tendon repair  Preferred Learning Style: verbal, visual, written  Precautions: WBAT        Pain:  Pain Assessment  Pain Assessment: 0-10  Pain Score: 4  Pain Location: Finger (Comment which one)  Pain Radiating Towards: RSF PIP  joint  Pain Descriptors: Sore  Patient's Stated Pain Goal: No pain    Objective    Splinting:  Splinting: Adjustment, Skin check  Splinting Education: Fitting, Precautions, Wear schedule, [place], [place]  Therapy/Activity: Therapeutic Exercise  Therapeutic Exercise Performed: Yes  Therapeutic Exercise Activity 1: Performed blocking PIP joint flexion with AAROM and PROM with light pressure with assist in flexion and extension; pt demonstrates and completed 10 reps;  Therapeutic Exercise Activity 2: Performed and completed 2 x 10 reps of DIP flexion with PIP support/immobilization,  PIP block; performed ; practiced and reviewed home program of reverse blocking performed during warm up with fluidotherapy with cues and direct therapist supervision Reviewed care and precautions, MCP ROM; progressing with light active ROM of PIP joint;   Therapeutic Activity  Therapeutic Activity Performed: Yes  Therapeutic Activity 1: completed T-putty pinch  and extension/rolling and flattening  Therapeutic Activity 2: gel glove extension abd 3 x 10 reps;    Manual Therapy  Manual Therapy Performed: Yes  Manual Therapy Activity 1: STM and joint mob of RSF PIP joint, edema reduction, use of coban assist, tolerated well     Extremities:  ROM   AROM right small finger   MCP  0/(95 (75)   PIP  -25/85 (60)  DIP 0/35 (20)  WEBB 185  WEBB improving degrees; passive greater than active;   (next tx provide PIP stabilization for after vivian trapper;  figure-8 splint)    Outcome Measures:   UEFI 70/80    OP EDUCATION:  Education  Individual(s) Educated: Patient  Education Provided: Diagnosis & Precautions, Symptom management, Orthotics wearing schedule and precautions, Risk and benefits of OT discussed with patient or other, POC discussed and agreed upon  Home Program: AROM, Activity modification, Orthotic wearing schedule, care and precautions, Wound/scar care, Handout issued  Diagnosis and Precautions: Wound care, joint protection with  ligamentous repair  Equipment: Thera-putty  Risk and Benefits Discussed with Patient/Caregiver/Other: yes  Patient/Caregiver Demonstrated Understanding: yes  Plan of Care Discussed and Agreed Upon: yes  Patient Response to Education: Patient/Caregiver Verbalized Understanding of Information, Patient/Caregiver Performed Return Demonstration of Exercises/Activities, Patient/Caregiver Asked Appropriate Questions    Goals:  Active       OT Problem       Patient will indeply complete don/doff splint correctly; Right small finger;  (Met)       Start:  02/23/24    Expected End:  04/23/24    Resolved:  03/01/24         PATIENT WILL DEMONSTRATE -15/90 PIP joint degrees RSF AROM IN extension/flexion to allow guitar playing.   (Progressing)       Start:  02/23/24    Expected End:  05/30/24            PATIENT WILL DEMONSTRATE FULL functional  with R small finger to DPC for holding coins in palm;  (Progressing)       Start:  02/23/24    Expected End:  05/30/24            PATIENT WILL SHOW A SIGNIFICANT CHANGE IN UEFI PATIENT REPORTED OUTCOME TOOL TO DEMONSTRATE SUBJECTIVE IMPROVEMENT (Progressing)       Start:  02/23/24    Expected End:  05/30/24            PATIENT WILL REPORT PAIN OF 0-1/10 DEMONSTRATING A REDUCTION OF OVERALL PAIN (Progressing)       Start:  02/23/24    Expected End:  05/30/24            PATIENT WILL DEMONSTRATE INDEPENDENCE IN HOME PROGRAM FOR SUPPORT OF PROGRESSION (Progressing)       Start:  02/23/24    Expected End:  05/30/24

## 2024-05-31 ENCOUNTER — TREATMENT (OUTPATIENT)
Dept: OCCUPATIONAL THERAPY | Facility: CLINIC | Age: 49
End: 2024-05-31
Payer: MEDICARE

## 2024-05-31 DIAGNOSIS — S63.286A DISLOCATION OF PROXIMAL INTERPHALANGEAL JOINT OF RIGHT LITTLE FINGER, INITIAL ENCOUNTER: ICD-10-CM

## 2024-05-31 PROCEDURE — 97110 THERAPEUTIC EXERCISES: CPT | Mod: GO,CO

## 2024-05-31 ASSESSMENT — PAIN DESCRIPTION - DESCRIPTORS: DESCRIPTORS: OTHER (COMMENT)

## 2024-05-31 ASSESSMENT — PAIN SCALES - GENERAL: PAINLEVEL_OUTOF10: 3

## 2024-05-31 ASSESSMENT — PAIN - FUNCTIONAL ASSESSMENT: PAIN_FUNCTIONAL_ASSESSMENT: 0-10

## 2024-05-31 NOTE — PROGRESS NOTES
"Occupational Therapy Treatment    Patient Name: Kashif Perdomo  MRN: [id number]  Today's Date: 5/31/2024    Time Calculation  Start Time: 0950  Stop Time: 1040  Time Calculation (min): 50 min  OT Therapeutic Procedures Time Entry  Therapeutic Exercise Time Entry: 50  Insurance:  Visit number: 3 of 6    Insurance: [employer] Medicare      Subjective   Current Problem:  1. Open dislocation of proximal interphalangeal (PIP) joint of right little finger          2. Dislocation of proximal interphalangeal joint of right little finger, initial encounter  Referral to Occupational Therapy     Follow Up In Occupational Therapy           General:   General  Reason for Referral: ADL impairment right hand  Referred By: Dr. Foster  Past Medical History Relevant to Rehab: dislocation 3-4 months ago; joint repair with volar plate and dorsal/volar tendon repair  Preferred Learning Style: verbal, visual, written  Referred by: Dr Foster, had follow up on 5/26. Discharged from doctor services.  stated he could have another surgery but the doctor felt that he would progress well but slowly.     Patient reports \"It just feels stiff and loaded. I saw the doctor and he thinks it is coming along but slowly. He said he could do another surgery to clean things up.But I think I am going to wait. \" Patient wearing spring ext splint 2 times a day for 30 min-up to an hour. [place] 8 every other  day for a couple of hours.   Goals reviewed with patient, he verbalized understanding  Performing HEP?: Yes    Pain: 0/10 with rest. 3/10 with PROM only  Pain Assessment  Pain Assessment: 0-10  Pain Score: 3 (3/10 with rest, 5/10 with movement)  Pain Location: Finger (Comment which one)  Pain Radiating Towards: R SF PIP joint  Pain Descriptors: Other (Comment) (loaded, stuck)  Patient's Stated Pain Goal: No pain    Objective   UEFI completed by patient today at   76/80( was 70 /80)  AROM passive greater than active;  Physical Observation: Edema: " min/trace(down from Min) in R SF at PIP     Sensory: Impaired  Numbness/Tingling:  noted today at tip of SF  Treatment:  Modalities:   Fluidoto R hand x 10 min during goal review and AROM to promote muscle movement during session.   Applied ice at end of session to R hand x 5 min during goal review.     Therapeutic Exercise:  PROM/manual distraction performed with coban by writer of SF PIP in flexion, good tolerance by pt.   [place] putty provided  to pt to use with finger ext rolls, completed 10 reps with cues to not over extend MP. Written program Added to HEP    Post-tx pain:0/10, unchanged since start of session.    Assessment/Plan Noted decreased pain and increase in UEFI score.  Pt to continue with edema control, use of modalities, blocking exercises, dynamic splint wear, PROM and increased resistance theraputty HEP. Motivated to return to function and leisure act. Pt planning to return to [employer] playing in the community this summer.     OP EDUCATION:  Education  Individual(s) Educated: Patient  Education Provided: Diagnosis & Precautions, Symptom management, Orthotics wearing schedule and precautions, Risk and benefits of OT discussed with patient or other, POC discussed and agreed upon  Home Program: AROM, Activity modification, Orthotic wearing schedule, care and precautions, Wound/scar care, Handout issued  Diagnosis and Precautions: Wound care, joint protection with ligamentous repair  Equipment: Thera-putty (upgraded to orange)  Risk and Benefits Discussed with Patient/Caregiver/Other: yes  Patient/Caregiver Demonstrated Understanding: yes  Plan of Care Discussed and Agreed Upon: yes  Patient Response to Education: Patient/Caregiver Verbalized Understanding of Information, Patient/Caregiver Performed Return Demonstration of Exercises/Activities, Patient/Caregiver Asked Appropriate Questions    Goals:  Active       OT Problem       Patient will indeply complete don/doff splint correctly; Right small  finger;  (Met)       Start:  02/23/24    Expected End:  04/23/24    Resolved:  03/01/24         PATIENT WILL DEMONSTRATE -15/90 PIP joint degrees RSF AROM IN extension/flexion to allow guitar playing.   (Progressing)       Start:  02/23/24    Expected End:  05/30/24            PATIENT WILL DEMONSTRATE FULL functional  with R small finger to DPC for holding coins in palm;  (Progressing)       Start:  02/23/24    Expected End:  05/30/24            PATIENT WILL SHOW A SIGNIFICANT CHANGE IN UEFI PATIENT REPORTED OUTCOME TOOL TO DEMONSTRATE SUBJECTIVE IMPROVEMENT (Met)       Start:  02/23/24    Expected End:  05/30/24    Resolved:  05/31/24         PATIENT WILL REPORT PAIN OF 0-1/10 DEMONSTRATING A REDUCTION OF OVERALL PAIN (Progressing)       Start:  02/23/24    Expected End:  05/30/24            PATIENT WILL DEMONSTRATE INDEPENDENCE IN HOME PROGRAM FOR SUPPORT OF PROGRESSION (Met)       Start:  02/23/24    Expected End:  05/30/24    Resolved:  05/31/24

## 2024-06-07 ENCOUNTER — APPOINTMENT (OUTPATIENT)
Dept: OCCUPATIONAL THERAPY | Facility: CLINIC | Age: 49
End: 2024-06-07
Payer: MEDICARE

## 2024-06-11 ENCOUNTER — TREATMENT (OUTPATIENT)
Dept: OCCUPATIONAL THERAPY | Facility: CLINIC | Age: 49
End: 2024-06-11
Payer: MEDICARE

## 2024-06-11 DIAGNOSIS — S63.286A DISLOCATION OF PROXIMAL INTERPHALANGEAL JOINT OF RIGHT LITTLE FINGER, INITIAL ENCOUNTER: ICD-10-CM

## 2024-06-11 PROCEDURE — 97110 THERAPEUTIC EXERCISES: CPT | Mod: GO | Performed by: OCCUPATIONAL THERAPIST

## 2024-06-11 PROCEDURE — 97763 ORTHC/PROSTC MGMT SBSQ ENC: CPT | Mod: GO | Performed by: OCCUPATIONAL THERAPIST

## 2024-06-11 PROCEDURE — 97140 MANUAL THERAPY 1/> REGIONS: CPT | Mod: GO | Performed by: OCCUPATIONAL THERAPIST

## 2024-06-11 ASSESSMENT — PAIN - FUNCTIONAL ASSESSMENT: PAIN_FUNCTIONAL_ASSESSMENT: 0-10

## 2024-06-11 ASSESSMENT — PAIN SCALES - GENERAL: PAINLEVEL_OUTOF10: 3

## 2024-06-11 ASSESSMENT — PAIN DESCRIPTION - DESCRIPTORS: DESCRIPTORS: SORE

## 2024-06-11 NOTE — PROGRESS NOTES
Occupational Therapy    Treatment    Patient Name: Kashif Perdomo  MRN: [id number]  : 1975  Today's Date: 24Time Calculation  Start Time: 1146  Stop Time: 1229  Time Calculation (min): 43 min  OT Therapeutic Procedures Time Entry  Manual Therapy Time Entry: 15  Orthotic/Prosthetic Mgmt and/or Training (Subs Encounter) Time Entry: 13  Therapeutic Exercise Time Entry: 15    Insurance:  Visit number: 4  of 6  new POC   [place]ization info: [place]  [place] Type: Payor: AET MEDICARE / Plan: AETNA MEDICARE ASSURE / Product Type: *No Product type* /     Assessment:  Pt reports frustrating with joint stiffness, and continued edema at PIP joint right small finger; Discussed fibrotic tissue and to continue with  dynamic splint;  continue with resting with static PIP blocking extension with DIP flexion, noting ORL tightness; refitted and issued PIP extension post tx;      OT Assessment Results: Decreased ADL status, Decreased upper extremity range of motion, Decreased upper extremity strength, Decreased fine motor control, Decreased IADLs  Plan:     OT Plan: 1 x week for 18 weeks  Duration: Other (Comment)  Onset Date: 24  Certification Period Start Date: 24  Certification Period End Date: 24  Number of Treatments Authorized: [place]  Rehab Potential: Good  Plan of Care Agreement: Patient    Subjective   Current Problem:  1. Dislocation of proximal interphalangeal joint of right little finger, initial encounter  Follow Up In Occupational Therapy        General:      General  Reason for Referral: ADL impairment Right hand  Referred By: Dr. Foster  Past Medical History Relevant to Rehab: 24  dislocation RSF PIP joint 3-4 months ago; joint repair with volar plate and dorsal/volar tendon repair  Preferred Learning Style: verbal, visual, written  Precautions: WBAT  UE Weight Bearing Status: Weight Bearing as Tolerated     Pain:  Pain Assessment  Pain Assessment: 0-10  Pain Score: 3  Pain  Location: Finger (Comment which one)  Pain Radiating Towards: right small finger  Pain Descriptors: Sore  Pain Frequency: Intermittent  Patient's Stated Pain Goal: No pain    Objective    Splinting:  Location: readjustment PIP extension splint  Splinting: Adjustment, Skin check  Splinting Education: Fitting, Precautions, Wear schedule, [place], [place]  Therapy/Activity: Therapeutic Exercise  Therapeutic Exercise Performed: Yes  Therapeutic Exercise Activity 1: Performed blocking PIP joint flexion with AAROM and PROM with light pressure with assist in flexion and extension; pt demonstrates and completed 10 reps;  Therapeutic Exercise Activity 2: Performed and completed 2 x 10 reps of DIP flexion with PIP support/immobilization,  PIP block; performed ; practiced and reviewed home program of reverse blocking Reviewed care and precautions, MCP ROM; progressing with light active ROM of PIP joint;        Manual Therapy  Manual Therapy Performed: Yes  Manual Therapy Activity 1: STM right small finger PIP extension/flex and DIP flex with PIP ext passively     Extremities:  ROM   AROM right small finger   MCP  0/95 (75)   PIP  -25/85 (60)  DIP 0/35 (20)  WEBB 185  WEBB improving degrees; passive greater than active;   (recheck PIP stabilization for after vivian trapper;  figure-8 splint)    Outcome Measures:   UEFI 70/80    OP EDUCATION:  Education  Individual(s) Educated: Patient  Education Provided: Diagnosis & Precautions, Symptom management, Orthotics wearing schedule and precautions, Risk and benefits of OT discussed with patient or other, POC discussed and agreed upon  Home Program: AROM, Activity modification, Orthotic wearing schedule, care and precautions, Wound/scar care, Handout issued  Diagnosis and Precautions: Wound care, joint protection with ligamentous repair  Equipment: Thera-putty (upgraded to orange)  Risk and Benefits Discussed with Patient/Caregiver/Other: yes  Patient/Caregiver Demonstrated  Understanding: yes  Plan of Care Discussed and Agreed Upon: yes  Patient Response to Education: Patient/Caregiver Verbalized Understanding of Information, Patient/Caregiver Performed Return Demonstration of Exercises/Activities, Patient/Caregiver Asked Appropriate Questions    Goals:  Active       OT Problem       Patient will indeply complete don/doff splint correctly; Right small finger;  (Met)       Start:  02/23/24    Expected End:  04/23/24    Resolved:  03/01/24         PATIENT WILL DEMONSTRATE -15/90 PIP joint degrees RSF AROM IN extension/flexion to allow guitar playing.   (Progressing)       Start:  02/23/24    Expected End:  07/31/24            PATIENT WILL DEMONSTRATE FULL functional  with R small finger to DPC for holding coins in palm;  (Progressing)       Start:  02/23/24    Expected End:  07/31/24            PATIENT WILL SHOW A SIGNIFICANT CHANGE IN UEFI PATIENT REPORTED OUTCOME TOOL TO DEMONSTRATE SUBJECTIVE IMPROVEMENT (Met)       Start:  02/23/24    Expected End:  05/30/24    Resolved:  05/31/24         PATIENT WILL REPORT PAIN OF 0-1/10 DEMONSTRATING A REDUCTION OF OVERALL PAIN (Progressing)       Start:  02/23/24    Expected End:  07/31/24            PATIENT WILL DEMONSTRATE INDEPENDENCE IN HOME PROGRAM FOR SUPPORT OF PROGRESSION (Met)       Start:  02/23/24    Expected End:  05/30/24    Resolved:  05/31/24

## 2024-06-14 ENCOUNTER — APPOINTMENT (OUTPATIENT)
Dept: OCCUPATIONAL THERAPY | Facility: CLINIC | Age: 49
End: 2024-06-14
Payer: MEDICARE

## 2024-06-21 ENCOUNTER — APPOINTMENT (OUTPATIENT)
Dept: OCCUPATIONAL THERAPY | Facility: CLINIC | Age: 49
End: 2024-06-21
Payer: MEDICARE

## 2024-06-25 ENCOUNTER — APPOINTMENT (OUTPATIENT)
Dept: OCCUPATIONAL THERAPY | Facility: CLINIC | Age: 49
End: 2024-06-25
Payer: MEDICARE

## 2024-07-09 ENCOUNTER — APPOINTMENT (OUTPATIENT)
Dept: OCCUPATIONAL THERAPY | Facility: CLINIC | Age: 49
End: 2024-07-09
Payer: MEDICARE

## 2024-07-25 ENCOUNTER — APPOINTMENT (OUTPATIENT)
Dept: OCCUPATIONAL THERAPY | Facility: CLINIC | Age: 49
End: 2024-07-25
Payer: MEDICARE

## 2024-07-25 DIAGNOSIS — S61.206A OPEN DISLOCATION OF PROXIMAL INTERPHALANGEAL (PIP) JOINT OF RIGHT LITTLE FINGER: Primary | ICD-10-CM

## 2024-07-25 DIAGNOSIS — S63.286A OPEN DISLOCATION OF PROXIMAL INTERPHALANGEAL (PIP) JOINT OF RIGHT LITTLE FINGER: Primary | ICD-10-CM

## 2024-07-25 DIAGNOSIS — S63.286A DISLOCATION OF PROXIMAL INTERPHALANGEAL JOINT OF RIGHT LITTLE FINGER, INITIAL ENCOUNTER: ICD-10-CM

## 2024-07-25 PROCEDURE — 97530 THERAPEUTIC ACTIVITIES: CPT | Mod: GO | Performed by: OCCUPATIONAL THERAPIST

## 2024-07-25 PROCEDURE — 97140 MANUAL THERAPY 1/> REGIONS: CPT | Mod: GO | Performed by: OCCUPATIONAL THERAPIST

## 2024-07-25 PROCEDURE — 97110 THERAPEUTIC EXERCISES: CPT | Mod: GO | Performed by: OCCUPATIONAL THERAPIST

## 2024-07-25 ASSESSMENT — PAIN SCALES - GENERAL: PAINLEVEL_OUTOF10: 2

## 2024-07-25 ASSESSMENT — PAIN DESCRIPTION - DESCRIPTORS: DESCRIPTORS: SORE

## 2024-07-25 ASSESSMENT — PAIN - FUNCTIONAL ASSESSMENT: PAIN_FUNCTIONAL_ASSESSMENT: 0-10

## 2024-07-25 NOTE — PROGRESS NOTES
Occupational Therapy    Treatment    Patient Name: Kashif Perdomo  MRN: [id number]  : 1975  Today's Date: 24Time Calculation  Start Time: 1013  Stop Time: 1057  Time Calculation (min): 44 min  OT Therapeutic Procedures Time Entry  Manual Therapy Time Entry: 15  Therapeutic Activity Time Entry: 14  Therapeutic Exercise Time Entry: 15    Insurance:  Visit number: 5  of 6  new POC   [place]ization info: [place]  Insurance Type: Payor: AET MEDICARE / Plan: AETNA MEDICARE ASSURE / Product Type: *No Product type* /     Assessment:  Pt reports more problems with hip and feet, plan to continue upgrade home program and encourage use of splinting/orthotics; re measurement of PIP joint right small finger; Encourage static PIP blocking extension with DIP flexion, noting ORL tightness; improved PIP extension post tx;      OT Assessment Results: Decreased ADL status, Decreased upper extremity range of motion, Decreased upper extremity strength, Decreased fine motor control, Decreased IADLs  Plan:     OT Plan: 1 x week for 18 weeks  Duration: Other (Comment)  Onset Date: 24  Certification Period Start Date: 24  Certification Period End Date: 24  Number of Treatments [place]ized: [place]  Rehab Potential: Good  Plan of Care Agreement: Patient    Subjective   Current Problem:  1. Open dislocation of proximal interphalangeal (PIP) joint of right little finger        2. Dislocation of proximal interphalangeal joint of right little finger, initial encounter  Follow Up In Occupational Therapy        General:      General  Reason for Referral: ADL impairment Right hand  Referred By: Dr. Foster  Past Medical History Relevant to Rehab: 24  dislocation RSF PIP joint 3-4 months ago; joint repair with volar plate and dorsal/volar tendon repair  Preferred Learning Style: verbal, visual, written  Precautions: WBAT  UE Weight Bearing Status: Weight Bearing as Tolerated  Precautions Comment: no precautions      Pain:  Pain Assessment  Pain Assessment: 0-10  0-10 (Numeric) Pain Score: 2  Pain Location: Finger (Comment which one)  Pain Orientation: Right  Pain Radiating Towards: right small PIP joint  Pain Descriptors: Sore  Pain Frequency: Intermittent  Patient's Stated Pain Goal: No pain    Objective    Splinting:  Splinting: Adjustment, Skin check  Splinting Education: Fitting, Precautions, Wear schedule, [place], [place]  Therapy/Activity: Therapeutic Exercise  Therapeutic Exercise Performed: Yes  Therapeutic Exercise Activity 1: Performed blocking PIP joint flexion with AAROM and PROM with light pressure with assist in flexion and extension; pt demonstrates and completed 10 reps;  Therapeutic Exercise Activity 2: Performed and completed 2 x 10 reps of DIP flexion with PIP support/immobilization,  PIP block; performed ; practiced and reviewed home program of reverse blocking   Reviewed care and precautions, MCP ROM; progressing with light active ROM of PIP joint;   Therapeutic Activity  Therapeutic Activity Performed: Yes  Therapeutic Activity 1: Completed  with hand helper gripper x 25 reps; 50 lbs. tolerated well  Therapeutic Activity 2: Gel glove with reaching extension/abd x 25 reps PIP ext focus    Manual Therapy  Manual Therapy Performed: Yes  Manual Therapy Activity 1: STM right small finger PIP extension/flex and DIP flex with PIP ext passively     Extremities:  ROM   AROM right small finger   MCP  0/100 (85)   PIP  -40/85 (85)  DIP 0/20 (35)  WEBB 170  WEBB improving degrees; passive greater than active;   (recheck PIP stabilization for after vivian trapper;  figure-8 splint)    Outcome Measures:   UEFI 70/80    OP EDUCATION:  Education  Individual(s) Educated: Patient  Education Provided: Diagnosis & Precautions, Symptom management, Orthotics wearing schedule and precautions, Risk and benefits of OT discussed with patient or other, POC discussed and agreed upon  Home Program: AROM, Activity modification,  Orthotic wearing schedule, care and precautions, Wound/scar care, Handout issued  Diagnosis and Precautions: Wound care, joint protection with ligamentous repair  Equipment: Thera-putty (upgraded to orange)  Risk and Benefits Discussed with Patient/Caregiver/Other: yes  Patient/Caregiver Demonstrated Understanding: yes  Plan of Care Discussed and Agreed Upon: yes  Patient Response to Education: Patient/Caregiver Verbalized Understanding of Information, Patient/Caregiver Performed Return Demonstration of Exercises/Activities, Patient/Caregiver Asked Appropriate Questions    Goals:  Active       OT Problem       Patient will indeply complete don/doff splint correctly; Right small finger;  (Met)       Start:  02/23/24    Expected End:  04/23/24    Resolved:  03/01/24         PATIENT WILL DEMONSTRATE -15/90 PIP joint degrees RSF AROM IN extension/flexion to allow guitar playing.   (Progressing)       Start:  02/23/24    Expected End:  08/15/24            PATIENT WILL DEMONSTRATE FULL functional  with R small finger to DPC for holding coins in palm;  (Progressing)       Start:  02/23/24    Expected End:  08/15/24            PATIENT WILL SHOW A SIGNIFICANT CHANGE IN UEFI PATIENT REPORTED OUTCOME TOOL TO DEMONSTRATE SUBJECTIVE IMPROVEMENT (Met)       Start:  02/23/24    Expected End:  05/30/24    Resolved:  05/31/24         PATIENT WILL REPORT PAIN OF 0-1/10 DEMONSTRATING A REDUCTION OF OVERALL PAIN (Progressing)       Start:  02/23/24    Expected End:  08/15/24            PATIENT WILL DEMONSTRATE INDEPENDENCE IN HOME PROGRAM FOR SUPPORT OF PROGRESSION (Met)       Start:  02/23/24    Expected End:  05/30/24    Resolved:  05/31/24

## 2024-08-08 ENCOUNTER — TREATMENT (OUTPATIENT)
Dept: OCCUPATIONAL THERAPY | Facility: CLINIC | Age: 49
End: 2024-08-08
Payer: MEDICARE

## 2024-08-08 DIAGNOSIS — S61.206A OPEN DISLOCATION OF PROXIMAL INTERPHALANGEAL (PIP) JOINT OF RIGHT LITTLE FINGER: Primary | ICD-10-CM

## 2024-08-08 DIAGNOSIS — S63.286A DISLOCATION OF PROXIMAL INTERPHALANGEAL JOINT OF RIGHT LITTLE FINGER, INITIAL ENCOUNTER: ICD-10-CM

## 2024-08-08 DIAGNOSIS — S63.286A OPEN DISLOCATION OF PROXIMAL INTERPHALANGEAL (PIP) JOINT OF RIGHT LITTLE FINGER: Primary | ICD-10-CM

## 2024-08-08 PROCEDURE — 97530 THERAPEUTIC ACTIVITIES: CPT | Mod: GO | Performed by: OCCUPATIONAL THERAPIST

## 2024-08-08 PROCEDURE — 97140 MANUAL THERAPY 1/> REGIONS: CPT | Mod: GO | Performed by: OCCUPATIONAL THERAPIST

## 2024-08-08 PROCEDURE — 97110 THERAPEUTIC EXERCISES: CPT | Mod: GO | Performed by: OCCUPATIONAL THERAPIST

## 2024-08-08 ASSESSMENT — PAIN SCALES - GENERAL: PAINLEVEL_OUTOF10: 2

## 2024-08-08 ASSESSMENT — PAIN - FUNCTIONAL ASSESSMENT: PAIN_FUNCTIONAL_ASSESSMENT: 0-10

## 2024-08-08 ASSESSMENT — PAIN DESCRIPTION - DESCRIPTORS: DESCRIPTORS: SORE

## 2024-08-08 NOTE — PROGRESS NOTES
Occupational Therapy    Treatment    Patient Name: Kashif Perdomo  MRN: [id number]  : 1975  Today's Date: 24Time Calculation  Start Time: 1031  Stop Time: 1114  Time Calculation (min): 43 min  OT Therapeutic Procedures Time Entry  Manual Therapy Time Entry: 10  Therapeutic Activity Time Entry: 15  Therapeutic Exercise Time Entry: 18    Insurance:  Visit number: 6  of 7  new POC   [place]ization info: [place]  Insurance Type: Payor: AET MEDICARE / Plan: AETNA MEDICARE ASSURE / Product Type: *No Product type* /     Assessment:  Pt reports needing one more visit for recheck of home program, extension of POC, pt reports using tumeric for joint pain and edema; Pt reports feeling better with his back, he is going to see a hip doctor; plan to continue upgrade home program and encourage use of splinting/orthotics indeply; reviewed and discussed HEP and ROM of PIP joint right small finger; Encourage static PIP blocking extension with DIP flexion, noting ORL tightness reduced post tx;      OT Assessment Results: Decreased ADL status, Decreased upper extremity range of motion, Decreased upper extremity strength, Decreased fine motor control, Decreased IADLs  Plan:     OT Plan: 1 x week for 18 weeks  Duration: Other (Comment)  Onset Date: 24  Certification Period Start Date: 24  Certification Period End Date: 24  Number of Treatments [place]ized: [place]  Rehab Potential: Good  Plan of Care Agreement: Patient    Subjective   Current Problem:  1. Open dislocation of proximal interphalangeal (PIP) joint of right little finger        2. Dislocation of proximal interphalangeal joint of right little finger, initial encounter  Follow Up In Occupational Therapy        General:      General  Reason for Referral: ADL impairment Right hand  Referred By: Dr. Foster  Past Medical History Relevant to Rehab: 24  dislocation RSF PIP joint 3-4 months ago; joint repair with volar plate and dorsal/volar  tendon repair  Preferred Learning Style: verbal, visual, written  Precautions: WBAT  UE Weight Bearing Status: Weight Bearing as Tolerated  Precautions Comment: no precautions     Pain:  Pain Assessment  Pain Assessment: 0-10  0-10 (Numeric) Pain Score: 2  Pain Location: Finger (Comment which one)  Pain Orientation: Right  Pain Descriptors: Sore  Pain Frequency: Intermittent  Patient's Stated Pain Goal: No pain    Objective    Splinting:  Splinting Education: Fitting, Precautions, Wear schedule, [place], [place]  Therapy/Activity: Therapeutic Exercise  Therapeutic Exercise Performed: Yes  Therapeutic Exercise Activity 1: Performed blocking PIP joint flexion with AAROM and PROM with light pressure with assist in flexion and extension; pt demonstrates and completed 10 reps;  Therapeutic Exercise Activity 2: Performed and completed 2 x 10 reps of DIP flexion with PIP support/immobilization,  PIP block; performed ; practiced and reviewed home program of reverse blocking   Reviewed care and precautions, MCP ROM; progressing with light active ROM of PIP joint;   Therapeutic Activity  Therapeutic Activity Performed: Yes  Therapeutic Activity 1: Gel glove with reaching extension/abd x 25 reps PIP ext focus    Manual Therapy  Manual Therapy Performed: Yes  Manual Therapy Activity 1: STM right small finger PIP extension/flex and DIP flex with PIP ext passively     Extremities:  ROM   AROM right small finger   MCP  0/95  (85)   PIP  -25/85 (85)  DIP 0/15 (35)  WEBB 170  WEBB  decrease of DIP flexion; WEBB good  (recheck PIP stabilization for after vivian trapper;  figure-8 splint)    Outcome Measures:   UEFI 70/80    OP EDUCATION:  Education  Individual(s) Educated: Patient  Education Provided: Diagnosis & Precautions, Symptom management, Orthotics wearing schedule and precautions, Risk and benefits of OT discussed with patient or other, POC discussed and agreed upon  Home Program: AROM, Activity modification, Orthotic wearing  schedule, care and precautions, Wound/scar care, Handout issued  Diagnosis and Precautions: Wound care, joint protection with ligamentous repair  Equipment: Thera-putty (upgraded to orange)  Risk and Benefits Discussed with Patient/Caregiver/Other: yes  Patient/Caregiver Demonstrated Understanding: yes  Plan of Care Discussed and Agreed Upon: yes  Patient Response to Education: Patient/Caregiver Verbalized Understanding of Information, Patient/Caregiver Performed Return Demonstration of Exercises/Activities, Patient/Caregiver Asked Appropriate Questions    Goals:  Active       OT Problem       Patient will indeply complete don/doff splint correctly; Right small finger;  (Met)       Start:  02/23/24    Expected End:  04/23/24    Resolved:  03/01/24         PATIENT WILL DEMONSTRATE -15/90 PIP joint degrees RSF AROM IN extension/flexion to allow guitar playing.   (Progressing)       Start:  02/23/24    Expected End:  08/15/24            PATIENT WILL DEMONSTRATE FULL functional  with R small finger to DPC for holding coins in palm;  (Progressing)       Start:  02/23/24    Expected End:  08/15/24            PATIENT WILL SHOW A SIGNIFICANT CHANGE IN UEFI PATIENT REPORTED OUTCOME TOOL TO DEMONSTRATE SUBJECTIVE IMPROVEMENT (Met)       Start:  02/23/24    Expected End:  05/30/24    Resolved:  05/31/24         PATIENT WILL REPORT PAIN OF 0-1/10 DEMONSTRATING A REDUCTION OF OVERALL PAIN (Progressing)       Start:  02/23/24    Expected End:  08/15/24            PATIENT WILL DEMONSTRATE INDEPENDENCE IN HOME PROGRAM FOR SUPPORT OF PROGRESSION (Met)       Start:  02/23/24    Expected End:  05/30/24    Resolved:  05/31/24

## 2024-08-26 ENCOUNTER — TREATMENT (OUTPATIENT)
Dept: OCCUPATIONAL THERAPY | Facility: CLINIC | Age: 49
End: 2024-08-26
Payer: MEDICARE

## 2024-08-26 DIAGNOSIS — S61.206A OPEN DISLOCATION OF PROXIMAL INTERPHALANGEAL (PIP) JOINT OF RIGHT LITTLE FINGER: Primary | ICD-10-CM

## 2024-08-26 DIAGNOSIS — S63.286A OPEN DISLOCATION OF PROXIMAL INTERPHALANGEAL (PIP) JOINT OF RIGHT LITTLE FINGER: Primary | ICD-10-CM

## 2024-08-26 DIAGNOSIS — S63.286A DISLOCATION OF PROXIMAL INTERPHALANGEAL JOINT OF RIGHT LITTLE FINGER, INITIAL ENCOUNTER: ICD-10-CM

## 2024-08-26 PROCEDURE — 97530 THERAPEUTIC ACTIVITIES: CPT | Mod: GO | Performed by: OCCUPATIONAL THERAPIST

## 2024-08-26 PROCEDURE — 97110 THERAPEUTIC EXERCISES: CPT | Mod: GO | Performed by: OCCUPATIONAL THERAPIST

## 2024-08-26 PROCEDURE — 97140 MANUAL THERAPY 1/> REGIONS: CPT | Mod: GO | Performed by: OCCUPATIONAL THERAPIST

## 2024-08-26 ASSESSMENT — PAIN SCALES - GENERAL: PAINLEVEL_OUTOF10: 2

## 2024-08-26 ASSESSMENT — PAIN DESCRIPTION - DESCRIPTORS: DESCRIPTORS: SORE

## 2024-08-26 ASSESSMENT — PAIN - FUNCTIONAL ASSESSMENT: PAIN_FUNCTIONAL_ASSESSMENT: 0-10

## 2024-08-26 NOTE — PROGRESS NOTES
Occupational Therapy    Treatment/Discharge    Patient Name: Kashif Perdomo  MRN: [id number]  : 1975  Today's Date: 24Time Calculation  Start Time: 1146  Stop Time: 1229  Time Calculation (min): 43 min  OT Therapeutic Procedures Time Entry  Manual Therapy Time Entry: 15  Therapeutic Activity Time Entry: 15  Therapeutic Exercise Time Entry: 13    Insurance:  Visit number: 7 of 7  pt completed plan   [place]ization info: [place]  Insurance Type: Payor: AET MEDICARE / Plan: AETNA MEDICARE ASSURE / Product Type: *No Product type* /     Assessment:  Pt wearing dynamic splint daily for 2 hours, reviewed precautions and wearing schedule. Pt using tumeric for right small finger and hip joint pain; (pt reports feeling better with his back, he is going to see a hip doctor);   Pt is continuing his [facility] home program and he was encouraged to use splinting 2 x day for 30 minutes; reviewed and discussed HEP and ROM of PIP joint right small finger; Encourage static PIP blocking extension with DIP flexion;       OT Assessment Results: Decreased upper extremity range of motion  Plan: Completed POC, plan to discharge to [facility] home program with good results, goals partially achieved, pt pleased and able to play guitar, has normal  strength for ADL tasks.     Duration: Other (Comment)  Onset Date: 24  Certification Period Start Date: 24  Certification Period End Date: 24  Number of Treatments [place]ized: [place]  Rehab Potential: Good  Plan of Care Agreement: Patient    Subjective   Current Problem:  1. Open dislocation of proximal interphalangeal (PIP) joint of right little finger        2. Dislocation of proximal interphalangeal joint of right little finger, initial encounter  Follow Up In Occupational Therapy        General:      General  Reason for Referral: ADL impairment Right hand  Referred By: Dr. Foster  Past Medical History Relevant to Rehab: 24  dislocation RSF PIP joint 3-4 months  ago; joint repair with volar plate and dorsal/volar tendon repair  Preferred Learning Style: verbal, visual, written  Precautions: WBAT  UE Weight Bearing Status: Weight Bearing as Tolerated  Precautions Comment: no precautions     Pain:  Pain Assessment  Pain Assessment: 0-10  0-10 (Numeric) Pain Score: 2  Pain Location: Finger (Comment which one)  Pain Orientation: Right  Pain Descriptors: Sore  Pain Frequency: Intermittent  Patient's Stated Pain Goal: No pain    Objective    Splinting:  Location: dynamic PIP extension  Splinting: Skin check  Splinting Education: Fitting, Precautions, Wear schedule, [place], [place]  Therapy/Activity: Therapeutic Exercise  Therapeutic Exercise Performed: Yes  Therapeutic Exercise Activity 1: Performed blocking PIP joint flexion with AAROM and PROM with light pressure with assist in flexion and extension; pt demonstrates and completed 10 reps;  Therapeutic Exercise Activity 2: Performed and completed 2 x 10 reps of DIP flexion with PIP support/immobilization,  pt completed FDS tendon gliding during fluidotherapy warm up;  practiced and reviewed home program of reverse blocking and RSF PIP extension  Therapeutic Exercise Activity 3: completed PROM and AAROM RSF 2x 10 reps     Therapeutic Activity  Therapeutic Activity Performed: Yes  Therapeutic Activity 1: Gel glove with reaching extension/abd x 25 reps PIP ext focus  Therapeutic Activity 2: Hand helper with gripper  40 lbs. with rotation 25 reps    Manual Therapy  Manual Therapy Performed: Yes  Manual Therapy Activity 1: STM right small finger PIP extension/flex and DIP flex with PIP ext passively     Extremities:  ROM   AROM right small finger   MCP  0/90   PIP  -25/90  DIP 0/15 (with blocking able to flex 35)  WEBB 170  WEBB  decrease of DIP flexion; WEBB good  (Continue with PIP stabilization vivian trapper;  figure-8 splint)    Outcome Measures:   Current UEFI 73/80    OP EDUCATION:  Education  Individual(s) Educated:  Patient  Education Provided: Diagnosis & Precautions, Symptom management, Orthotics wearing schedule and precautions, Risk and benefits of OT discussed with patient or other, POC discussed and agreed upon  Home Program: AROM, Activity modification, Orthotic wearing schedule, care and precautions, Wound/scar care, Handout issued  Diagnosis and Precautions: Wound care, joint protection with ligamentous repair  Equipment: Thera-putty (upgraded to orange)  Risk and Benefits Discussed with Patient/Caregiver/Other: yes  Patient/Caregiver Demonstrated Understanding: yes  Plan of Care Discussed and Agreed Upon: yes  Patient Response to Education: Patient/Caregiver Verbalized Understanding of Information, Patient/Caregiver Performed Return Demonstration of Exercises/Activities, Patient/Caregiver Asked Appropriate Questions    Goals:  Resolved       OT Problem       Patient will indeply complete don/doff splint correctly; Right small finger;  (Met)       Start:  02/23/24    Expected End:  04/23/24    Resolved:  03/01/24         PATIENT WILL DEMONSTRATE -15/90 PIP joint degrees RSF AROM IN extension/flexion to allow guitar playing.   (Adequate for Discharge)       Start:  02/23/24    Expected End:  09/30/24            PATIENT WILL DEMONSTRATE FULL functional  with R small finger to DPC for holding coins in palm;  (Adequate for Discharge)       Start:  02/23/24    Expected End:  09/30/24            PATIENT WILL SHOW A SIGNIFICANT CHANGE IN UEFI PATIENT REPORTED OUTCOME TOOL TO DEMONSTRATE SUBJECTIVE IMPROVEMENT (Met)       Start:  02/23/24    Expected End:  05/30/24    Resolved:  05/31/24         PATIENT WILL REPORT PAIN OF 0-1/10 DEMONSTRATING A REDUCTION OF OVERALL PAIN (Adequate for Discharge)       Start:  02/23/24    Expected End:  09/30/24            PATIENT WILL DEMONSTRATE INDEPENDENCE IN HOME PROGRAM FOR SUPPORT OF PROGRESSION (Met)       Start:  02/23/24    Expected End:  05/30/24    Resolved:  05/31/24

## (undated) DEVICE — DRESSING, GAUZE, PETROLATUM, PATCH, XEROFORM, 1 X 8 IN, STERILE

## (undated) DEVICE — GLOVE, SURGICAL, PROTEXIS PI , 7.5, PF, LF

## (undated) DEVICE — SPONGE, GAUZE, 12 PLY, 4 X 4, STERILE, DISP

## (undated) DEVICE — APPLICATOR, CHLORAPREP, W/ORANGE TINT, 26ML

## (undated) DEVICE — BANDAGE, ELASTIC, MATRIX, SELF-CLOSURE, 2 IN X 5 YD, LF

## (undated) DEVICE — ADHESIVE, SKIN, DERMABOND ADVANCED, 15CM, PEN-STYLE

## (undated) DEVICE — COVER, MAYO STAND, W/PAD, 23 IN, DISPOSABLE, PLASTIC, LF, STERILE

## (undated) DEVICE — DRAPE, SHEET, LARGE, 70 X 85IN, STERILE

## (undated) DEVICE — SUTURE, FIBERWIRE 3-0, T-43 TAPER NEEDLE, 18"

## (undated) DEVICE — Device

## (undated) DEVICE — DRESSING, GAUZE, SPONGE, VERSALON, ALL PURPOSE, 4 X 4 IN, SOFT

## (undated) DEVICE — GOWN, SURGICAL, SIRUS, NON REINFORCED, LARGE

## (undated) DEVICE — SUTURE, ETHILON 5-0, P3, BLK MONO, 12/BX

## (undated) DEVICE — WIRE, C-WIRE PAK, ORTHO, DBL ENDED, SPADE TIP, .045IN

## (undated) DEVICE — CORD, CAUTERY, BIOPOLAR FORCEP, 12FT

## (undated) DEVICE — TUBING, SUCTION, 6MM X 10, CLEAN N-COND

## (undated) DEVICE — SPONGE, GAUZE, AVANT, STERILE, NONWOVEN, 4PLY, 4 X 4, STANDARD

## (undated) DEVICE — PADDING, UNDERCAST, WEBRIL, 3 IN X 4 YD, REG, NS

## (undated) DEVICE — SOLUTION, IRRIGATION, X RX SODIUM CHL 0.9%, 1000ML BTL

## (undated) DEVICE — SOLUTION, IRRIGATION, USP, SODIUM CHLORIDE 0.9%, 3000 ML, BAG

## (undated) DEVICE — GLOVE, SURGICAL, PROTEXIS PI BLUE W/NEUTHERA, 7.5, PF, LF